# Patient Record
Sex: FEMALE | Race: WHITE | NOT HISPANIC OR LATINO | URBAN - METROPOLITAN AREA
[De-identification: names, ages, dates, MRNs, and addresses within clinical notes are randomized per-mention and may not be internally consistent; named-entity substitution may affect disease eponyms.]

---

## 2017-01-01 ENCOUNTER — HOME CARE/HOSPICE - HEALTHEAST (OUTPATIENT)
Dept: HOME HEALTH SERVICES | Facility: HOME HEALTH | Age: 67
End: 2017-01-01

## 2017-01-01 ENCOUNTER — COMMUNICATION - HEALTHEAST (OUTPATIENT)
Dept: HOME HEALTH SERVICES | Facility: HOME HEALTH | Age: 67
End: 2017-01-01

## 2017-01-01 ENCOUNTER — AMBULATORY - HEALTHEAST (OUTPATIENT)
Dept: VASCULAR SURGERY | Facility: CLINIC | Age: 67
End: 2017-01-01

## 2017-01-01 ENCOUNTER — OFFICE VISIT - HEALTHEAST (OUTPATIENT)
Dept: VASCULAR SURGERY | Facility: CLINIC | Age: 67
End: 2017-01-01

## 2017-01-01 ENCOUNTER — TRANSFERRED RECORDS (OUTPATIENT)
Dept: HEALTH INFORMATION MANAGEMENT | Facility: CLINIC | Age: 67
End: 2017-01-01

## 2017-01-01 ENCOUNTER — COMMUNICATION - HEALTHEAST (OUTPATIENT)
Dept: VASCULAR SURGERY | Facility: CLINIC | Age: 67
End: 2017-01-01

## 2017-01-01 ENCOUNTER — OFFICE VISIT (OUTPATIENT)
Dept: GASTROENTEROLOGY | Facility: CLINIC | Age: 67
End: 2017-01-01
Attending: INTERNAL MEDICINE
Payer: COMMERCIAL

## 2017-01-01 ENCOUNTER — HOSPITAL ENCOUNTER (OUTPATIENT)
Dept: ULTRASOUND IMAGING | Facility: HOSPITAL | Age: 67
Discharge: HOME | End: 2017-07-28
Attending: INTERNAL MEDICINE

## 2017-01-01 ENCOUNTER — AMBULATORY - HEALTHEAST (OUTPATIENT)
Dept: LAB | Facility: HOSPITAL | Age: 67
End: 2017-01-01

## 2017-01-01 ENCOUNTER — MEDICAL CORRESPONDENCE (OUTPATIENT)
Dept: TRANSPLANT | Facility: CLINIC | Age: 67
End: 2017-01-01

## 2017-01-01 ENCOUNTER — TELEPHONE (OUTPATIENT)
Dept: GASTROENTEROLOGY | Facility: CLINIC | Age: 67
End: 2017-01-01

## 2017-01-01 ENCOUNTER — RECORDS - HEALTHEAST (OUTPATIENT)
Dept: ADMINISTRATIVE | Facility: OTHER | Age: 67
End: 2017-01-01

## 2017-01-01 ENCOUNTER — COMMUNICATION - HEALTHEAST (OUTPATIENT)
Dept: OCCUPATIONAL THERAPY | Facility: CLINIC | Age: 67
End: 2017-01-01

## 2017-01-01 ENCOUNTER — OFFICE VISIT (OUTPATIENT)
Dept: TRANSPLANT | Facility: CLINIC | Age: 67
End: 2017-01-01
Attending: TRANSPLANT SURGERY
Payer: COMMERCIAL

## 2017-01-01 ENCOUNTER — TELEPHONE (OUTPATIENT)
Dept: TRANSPLANT | Facility: CLINIC | Age: 67
End: 2017-01-01

## 2017-01-01 ENCOUNTER — COMMITTEE REVIEW (OUTPATIENT)
Dept: TRANSPLANT | Facility: CLINIC | Age: 67
End: 2017-01-01

## 2017-01-01 ENCOUNTER — APPOINTMENT (OUTPATIENT)
Dept: TRANSPLANT | Facility: CLINIC | Age: 67
End: 2017-01-01
Attending: INTERNAL MEDICINE
Payer: COMMERCIAL

## 2017-01-01 ENCOUNTER — HOSPITAL ENCOUNTER (OUTPATIENT)
Dept: CARDIOLOGY | Facility: HOSPITAL | Age: 67
Discharge: HOME | End: 2017-08-01
Attending: INTERNAL MEDICINE

## 2017-01-01 VITALS
HEIGHT: 60 IN | WEIGHT: 224 LBS | TEMPERATURE: 98 F | SYSTOLIC BLOOD PRESSURE: 110 MMHG | OXYGEN SATURATION: 97 % | RESPIRATION RATE: 16 BRPM | HEART RATE: 77 BPM | DIASTOLIC BLOOD PRESSURE: 64 MMHG | BODY MASS INDEX: 43.98 KG/M2

## 2017-01-01 VITALS — WEIGHT: 170 LBS | BODY MASS INDEX: 32.1 KG/M2 | HEIGHT: 61 IN

## 2017-01-01 DIAGNOSIS — L97.922 ULCERS OF BOTH LOWER LEGS WITH FAT LAYER EXPOSED (H): ICD-10-CM

## 2017-01-01 DIAGNOSIS — E50.9 VITAMIN A DEFICIENCY: ICD-10-CM

## 2017-01-01 DIAGNOSIS — K74.3 PRIMARY BILIARY CIRRHOSIS (H): ICD-10-CM

## 2017-01-01 DIAGNOSIS — I87.303 VENOUS HYPERTENSION OF BOTH LOWER EXTREMITIES: ICD-10-CM

## 2017-01-01 DIAGNOSIS — L90.5 SCAR CONDITION AND FIBROSIS OF SKIN: ICD-10-CM

## 2017-01-01 DIAGNOSIS — K76.6 HYPERTENSION, PORTAL (H): ICD-10-CM

## 2017-01-01 DIAGNOSIS — L97.912 ULCERS OF BOTH LOWER LEGS WITH FAT LAYER EXPOSED (H): ICD-10-CM

## 2017-01-01 DIAGNOSIS — L85.9 HYPERKERATOSIS OF SKIN: ICD-10-CM

## 2017-01-01 DIAGNOSIS — L97.922 LEG ULCER, LEFT, WITH FAT LAYER EXPOSED (H): ICD-10-CM

## 2017-01-01 DIAGNOSIS — I89.0 ACQUIRED LYMPHEDEMA OF LEG: ICD-10-CM

## 2017-01-01 DIAGNOSIS — R60.9 DEPENDENT EDEMA: ICD-10-CM

## 2017-01-01 DIAGNOSIS — E66.01 MORBID OBESITY WITH BMI OF 40.0-44.9, ADULT (H): ICD-10-CM

## 2017-01-01 DIAGNOSIS — R18.8 ASCITES: ICD-10-CM

## 2017-01-01 DIAGNOSIS — R17 JAUNDICE: ICD-10-CM

## 2017-01-01 DIAGNOSIS — I89.0 LYMPHEDEMA: ICD-10-CM

## 2017-01-01 DIAGNOSIS — I89.0 LYMPHEDEMA OF BOTH LOWER EXTREMITIES: ICD-10-CM

## 2017-01-01 DIAGNOSIS — K74.3 PRIMARY BILIARY CIRRHOSIS (H): Primary | ICD-10-CM

## 2017-01-01 DIAGNOSIS — R60.0 BILATERAL LEG EDEMA: ICD-10-CM

## 2017-01-01 DIAGNOSIS — R18.8 OTHER ASCITES: ICD-10-CM

## 2017-01-01 DIAGNOSIS — K74.3 HEPATIC CIRRHOSIS DUE TO PRIMARY BILIARY CHOLANGITIS (H): Primary | ICD-10-CM

## 2017-01-01 DIAGNOSIS — K74.3 PBC (PRIMARY BILIARY CIRRHOSIS): ICD-10-CM

## 2017-01-01 DIAGNOSIS — E55.9 VITAMIN D DEFICIENCY: ICD-10-CM

## 2017-01-01 DIAGNOSIS — L03.90 CELLULITIS: ICD-10-CM

## 2017-01-01 DIAGNOSIS — K74.4 SECONDARY BILIARY CIRRHOSIS (H): Primary | ICD-10-CM

## 2017-01-01 DIAGNOSIS — E66.01 MORBID OBESITY DUE TO EXCESS CALORIES (H): ICD-10-CM

## 2017-01-01 DIAGNOSIS — K70.31 ASCITES DUE TO ALCOHOLIC CIRRHOSIS (H): ICD-10-CM

## 2017-01-01 DIAGNOSIS — K74.3 HEPATIC CIRRHOSIS DUE TO PRIMARY BILIARY CHOLANGITIS (H): ICD-10-CM

## 2017-01-01 DIAGNOSIS — K74.3 CIRRHOSIS, PRIMARY BILIARY: ICD-10-CM

## 2017-01-01 LAB
AORTIC ROOT: 3.1 CM
AORTIC VALVE MEAN VELOCITY: 121 CM/S
AV DIMENSIONLESS INDEX VTI: 0.8
AV MEAN GRADIENT: 7 MMHG
AV PEAK GRADIENT: 13.7 MMHG
AV VALVE AREA: 2.4 CM2
AV VELOCITY RATIO: 0.6
BSA FOR ECHO PROCEDURE: 1.8 M2
CV BLOOD PRESSURE: NORMAL MMHG
CV ECHO HEIGHT: 61 IN
CV ECHO WEIGHT: 167 LBS
DOP CALC AO PEAK VEL: 185 CM/S
DOP CALC AO VTI: 41.6 CM
DOP CALC LVOT AREA: 3.14 CM2
DOP CALC LVOT DIAMETER: 2 CM
DOP CALC LVOT PEAK VEL: 115 CM/S
DOP CALC LVOT STROKE VOLUME: 99.9 CM3
DOP CALC MV VTI: 33.9 CM
DOP CALCLVOT PEAK VEL VTI: 31.8 CM
EJECTION FRACTION: 64 % (ref 55–75)
FRACTIONAL SHORTENING: 41.4 % (ref 28–44)
INTERVENTRICULAR SEPTUM IN END DIASTOLE: 0.83 CM (ref 0.6–0.9)
IVS/PW RATIO: 0.8
LA AREA 1: 21.3 CM2
LA AREA 2: 21.2 CM2
LEFT ATRIUM LENGTH: 5.4 CM
LEFT ATRIUM SIZE: 4.3 CM
LEFT ATRIUM TO AORTIC ROOT RATIO: 1.39 NO UNITS
LEFT ATRIUM VOLUME INDEX: 39.5 ML/M2
LEFT ATRIUM VOLUME: 71.1 CM3
LEFT VENTRICLE CARDIAC INDEX: 3.8 L/MIN/M2
LEFT VENTRICLE CARDIAC OUTPUT: 6.8 L/MIN
LEFT VENTRICLE DIASTOLIC VOLUME INDEX: 30.9 CM3/M2 (ref 34–74)
LEFT VENTRICLE DIASTOLIC VOLUME: 55.7 CM3 (ref 46–106)
LEFT VENTRICLE HEART RATE: 68 BPM
LEFT VENTRICLE MASS INDEX: 89.6 G/M2
LEFT VENTRICLE SYSTOLIC VOLUME INDEX: 11.2 CM3/M2 (ref 11–31)
LEFT VENTRICLE SYSTOLIC VOLUME: 20.1 CM3 (ref 14–42)
LEFT VENTRICULAR INTERNAL DIMENSION IN DIASTOLE: 4.78 CM (ref 3.8–5.2)
LEFT VENTRICULAR INTERNAL DIMENSION IN SYSTOLE: 2.8 CM (ref 2.2–3.5)
LEFT VENTRICULAR MASS: 161.2 G
LEFT VENTRICULAR OUTFLOW TRACT MEAN GRADIENT: 3 MMHG
LEFT VENTRICULAR OUTFLOW TRACT MEAN VELOCITY: 78.9 CM/S
LEFT VENTRICULAR OUTFLOW TRACT PEAK GRADIENT: 5 MMHG
LEFT VENTRICULAR POSTERIOR WALL IN END DIASTOLE: 1.1 CM (ref 0.6–0.9)
LV STROKE VOLUME INDEX: 55.5 ML/M2
MITRAL VALVE DECELERATION SLOPE: 3130 MM/S2
MITRAL VALVE E/A RATIO: 0.8
MITRAL VALVE MEAN INFLOW VELOCITY: 61.4 CM/S
MITRAL VALVE PEAK VELOCITY: 99 CM/S
MITRAL VALVE PRESSURE HALF-TIME: 90 MS
MV AREA VTI: 2.95 CM2
MV AVERAGE E/E' RATIO: 9.9 CM/S
MV DECELERATION TIME: 282 MS
MV E'TISSUE VEL-LAT: 9.38 CM/S
MV E'TISSUE VEL-MED: 8.22 CM/S
MV LATERAL E/E' RATIO: 9.3
MV MEAN GRADIENT: 2 MMHG
MV MEDIAL E/E' RATIO: 10.6
MV PEAK A VELOCITY: 110 CM/S
MV PEAK E VELOCITY: 87.3 CM/S
MV PEAK GRADIENT: 3.9 MMHG
MV VALVE AREA BY CONTINUITY EQUATION: 2.9 CM2
MV VALVE AREA PRESSURE 1/2 METHOD: 2.4 CM2
NUC REST DIASTOLIC VOLUME INDEX: 2672 LBS
NUC REST SYSTOLIC VOLUME INDEX: 61 IN
RIGHT VENTRICULAR INTERNAL DIMENSION IN DYSTOLE: 3.13 CM
TRICUSPID REGURGITATION PEAK PRESSURE GRADIENT: 28.1 MMHG
TRICUSPID VALVE ANULAR PLANE SYSTOLIC EXCURSION: 3.1 CM
TRICUSPID VALVE PEAK REGURGITANT VELOCITY: 265 CM/S

## 2017-01-01 RX ORDER — LACTULOSE 10 G/15ML
30 SOLUTION ORAL 2 TIMES DAILY
COMMUNITY

## 2017-01-01 ASSESSMENT — MIFFLIN-ST. JEOR
SCORE: 1451.69
SCORE: 1224.89
SCORE: 1542.4

## 2017-01-01 ASSESSMENT — PAIN SCALES - GENERAL: PAINLEVEL: NO PAIN (0)

## 2017-06-26 PROBLEM — K74.3 PRIMARY BILIARY CIRRHOSIS (H): Status: ACTIVE | Noted: 2017-01-01

## 2017-06-26 PROBLEM — K76.82 HEPATIC ENCEPHALOPATHY (H): Status: ACTIVE | Noted: 2017-01-01

## 2017-06-26 NOTE — TELEPHONE ENCOUNTER
PBC    MELD 13-19  Issues with encephalopathy recently    LM on home phone to discuss recent health events, and scheduling for eval vs consult    Plan to schedule with Dr. Lee 7/11

## 2017-06-26 NOTE — LETTER
2017    [facility]  [address] [place] [address] [address]  [facility] [address]  Phone: [phone]  Fax: [phone]     Re: Maisha Syed  : 1950     Dear Dr. Gamaliel Calvert,    Thank you for referring your patient, Maisha Syed. We are writing to inform you that Ms. Syed has completed the initial referral process with us to be considered for a liver transplant at the [facility].    Ms. Syed assigned transplant coordinator is Carla Schwartz.  If you should have any questions or concerns, please feel free to call us at [phone] or [phone].  Our regular office hours are Monday - Friday from 8:30am to 5:00pm.    Sincerely,        Liver Transplant Team  [facility]

## 2017-06-26 NOTE — TELEPHONE ENCOUNTER
Received referral from JOSE Calvert  Diagnosis- Primary Biliary Cirrhosis  Coordinator- Carla [id number] [facility]- [id number]   on AM for intake questions

## 2017-06-27 NOTE — TELEPHONE ENCOUNTER
Intake Progress Note    Organ: Liver        On Dialysis:   No    Dialysis Schedule:  Days/shift  or N/A    Reported Medical History:Spoke with - Devon- she tires easily- Cirrhosis, Paracentesis x 2  Inpt- [facility] x 3 since Jan    Records:  I will request.     Clinic RN Appt (Only Adult Kidney, Liver and Pancreas Referrals): Y     Preferred Evaluation Start Date:  ASAP     Online Forms    Best time patient can be reached: anytime    Type of packet sent:  Liver     Misc. Notes: May speak with emergency contacts listed in OTTR.   Devon    Verbal Consent: Y    Email Consent: Y or N    If no PCP or referring information the time of call informed pt to call back with information: Y or N    Informed patient to call if doesn't receive packet and or phone call from coordinator within given time Y

## 2017-06-29 PROBLEM — R18.8 ASCITES: Status: ACTIVE | Noted: 2017-01-01

## 2017-07-12 NOTE — TELEPHONE ENCOUNTER
Catalina was able to reach patient and/or family member. They state that they do not wish to proceed with transplant at this time, which is why they are not returning phone calls.     Letter sent to patient and referring. Episode will be closed, but they can call with questions/changes at any time.

## 2017-07-31 NOTE — Clinical Note
Please schedule for consult with Jesus on his next eval day, 8/22 I think... That is all she will do. No labs... Please call her with appt. Thanks, D

## 2017-07-31 NOTE — LETTER
2017    Gamaliel Calvert  [facility]   [facility] [address]  [facility] [address]  Phone: [phone]  Fax: [phone]     Arnoldo Moore  [facility]   [facility] [address]  [facility] [address]  Phone: [phone]  Fax: [phone]     Re: Maisha Syed  : 1950     Dear Dr. Gamaliel Calvert, Arnoldo Moore,    Thank you for referring your patient, Maisha Syed. We are writing to inform you that Ms. Syed has completed the initial referral process with us to be considered for a liver transplant at the [facility].    Ms. Syed assigned transplant coordinator is Carla Ngo.  If you should have any questions or concerns, please feel free to call us at [phone] or [phone].  Our regular office hours are Monday - Friday from 8:30am to 5:00pm.    Sincerely,        Liver Transplant Team  [facility]

## 2017-07-31 NOTE — TELEPHONE ENCOUNTER
Pt  Devon called, stated they had a appointment with [facility] and recommended and encouraged pt to start evaluation at [facility]- per - pt is OK to go forward.  They still have the packet.   Sent  to Leroy BARRY to contact patient or   [facility] sent last clinic visit with labs.

## 2017-07-31 NOTE — LETTER
LIVER CONSULT   SCHEDULE    Patient:   Maisha Syed  MR#:    [id number]    Coordinator:  Carla Tineo                                        [phone]  :     Shena SUNSHINE     [phone]  [facility]    [facility]  Date(s):    August 22, 2017    This is your consult schedule, please follow dates and times.  You will   receive reminder phone calls for other tests, but please follow this schedule  only!  If you have any questions about dates and times, please call us on  number listed above.  Thank you, Transplant Services       Day/Date:    Tuesday, August 22, 2017  Time Location Activity   9:30 a.m. Transplant Services  (3rd floor [facility]) Review evaluation process & daily schedule   10:00 a.m. Medicine Specialties  (3rd floor [facility]) Appointment with Dr. Lee,  Hepatologist       Day/Date:    Every Thursday *OPTIONAL*  Time Location Activity   12:00p.m. - 1:30p.m. Liver Transplant Support Group  Hospital Station [facility]  Room 7-120 Every Thursday *OPTIONAL*     *IF ON LINE CHECK IN IS NOT DONE, YOU WILL NEED TO CHECK IN 15 MINUTES EARLIER THEN THE FIRST SCHEDULED APPOINTMENT*

## 2017-08-02 NOTE — TELEPHONE ENCOUNTER
This patient agreed to come for Liver Consult Appointments on 8/22/17. All appointments scheduled and a copy of the Liver Consult Schedule has been sent to the patient via [employer].

## 2017-08-02 NOTE — TELEPHONE ENCOUNTER
"Referring: Arnoldo Moore    PBC, dx about 10 years ago. Mother has it as well, was found by primary when tested for it. Started having issues about 2 years ago, worst within the last 6 months    Legs were weeping, seeing lymph edema clinic   Ascites, having paracentesis, on diuretics. Last paracentesis was Feb or March. Attempted para yesterday, but unable to pull fluid off.     Med hx  Heart murmur  Kidney issues now, seeing nephrology at primary clinic next week    Surgery hx  Cholecytectomy about 15 years ago  Liver biopsy done about 4 years ago    , 3 adult children, I passed away in motorcycle accident       Patient was referred for txp evaluation by another MD over a month ago. She refused, stated she did not want evaluation. Episode was closed. She then was seen recently by Dr. Moore at [facility], who strongly encouraged her to come in for evaluation, she agreed. I started discussing the evaluation process with her, she stopped me, and told me if we planned on doing any testing or lab work to forget it, \"I would rather die than do any of that.\" She will not agree to any lab work or testing. Offerred her a solo appt with a Hepatologist as a consult to discuss plan moving forward, she agreed with that.     Plan for consult with no labs or US with Dr. Lee on 8/22      "

## 2017-08-10 NOTE — TELEPHONE ENCOUNTER
Patient contacted and reminded of upcoming appointment.  Patient confirmed they will be attending.  Patient instructed to bring updated medications list to appointment.  Instructed pt to arrive an hour and a half to two hours prior to appt time for labs.  Luis Meade, CMA

## 2017-08-22 NOTE — LETTER
8/22/2017       RE: Maisha Syed  [address] [facility] [address]     Dear Colleague,    Thank you for referring your patient, Maisha Syed, to the [facility] SOLID ORGAN TRANSPLANT at [facility]. Please see a copy of my visit note below.    Patient attended all appointments and completed all scheduled tests.  Patient to follow up with Transplant Coordinator.  Patient stated understanding and has contact numbers.      Again, thank you for allowing me to participate in the care of your patient.      Sincerely,    Transplant Evaluation Resource

## 2017-08-22 NOTE — MR AVS SNAPSHOT
"              After Visit Summary   2017    Maisha Syed    MRN: [id number]           Patient Information     Date Of Birth          1950        Visit Information        Provider Department      2017 10:30 AM Yann Mir MD [facility] Solid Organ Transplant        Today's Diagnoses     Secondary biliary cirrhosis (H)    -  1       Follow-ups after your visit        Who to contact     If you have questions or need follow up information about today's clinic visit or your schedule please contact [facility] SOLID ORGAN TRANSPLANT directly at [phone].  Normal or non-critical lab and imaging results will be communicated to you by InishTechhart, letter or phone within 4 business days after the clinic has received the results. If you do not hear from us within 7 days, please contact the clinic through Aionext or phone. If you have a critical or abnormal lab result, we will notify you by phone as soon as possible.  Submit refill requests through Belmont or call your pharmacy and they will forward the refill request to us. Please allow 3 business days for your refill to be completed.          Additional Information About Your Visit        MyChart Information     Belmont lets you send messages to your doctor, view your test results, renew your prescriptions, schedule appointments and more. To sign up, go to [email or web address] . Click on \"Log in\" on the left side of the screen, which will take you to the Welcome page. Then click on \"Sign up Now\" on the right side of the page.     You will be asked to enter the access code listed below, as well as some personal information. Please follow the directions to create your username and password.     Your access code is: [email or web address]  Expires: 2017  6:31 AM     Your access code will  in 90 days. If you need help or a new code, please call your [place] clinic or [phone].        Care EveryWhere ID     This is your Care EveryWhere ID. This could " be used by other organizations to access your [place] medical records  [id number]         Blood Pressure from Last 3 Encounters:   08/22/17 110/64    Weight from Last 3 Encounters:   08/22/17 101.6 kg (224 lb)   06/27/17 77.1 kg (170 lb)              Today, you had the following     No orders found for display         Today's Medication Changes          These changes are accurate as of: 8/22/17 11:59 PM.  If you have any questions, ask your nurse or doctor.               Stop taking these medicines if you haven't already. Please contact your care team if you have questions.     OMEPRAZOLE PO   Stopped by:  Devang Lee MD           RIFAXIMIN PO   Stopped by:  Devang Lee MD                    Primary Care Provider Office Phone # Fax #    Narinder Lopez [phone]       [facility] [address]  [address]  [facility] [address]        Equal Access to Services     [facility]: Hadii elizabeth dodgeo Soluis alberto, waaxda luqzeynep, qaybta kaalmada aderaeyanikhil, ethan alfaro . So [facility] [phone].    ATENCIÓN: Si habla español, tiene a garcia disposición servicios gratuitos de asistencia lingüística. Zoila al [phone].    We comply with applicable federal civil rights laws and [place] laws. We do not discriminate on the basis of race, color, national origin, age, disability sex, sexual orientation or gender identity.            Thank you!     Thank you for choosing [facility] SOLID ORGAN TRANSPLANT  for your care. Our goal is always to provide you with excellent care. Hearing back from our patients is one way we can continue to improve our services. Please take a few minutes to complete the written survey that you may receive in the mail after your visit with us. Thank you!             Your Updated Medication List - Protect others around you: Learn how to safely use, store and throw away your medicines at www.disposemymeds.org.          This list is accurate as of:  8/22/17 11:59 PM.  Always use your most recent med list.                   Brand Name Dispense Instructions for use Diagnosis    BUMEX PO      Take 1 mg by mouth daily        lactulose 10 GM/15ML solution    CHRONULAC     Take 30 g by mouth 2 times daily        LEVOTHYROXINE SODIUM PO      Take 112 mcg by mouth daily        MAGNESIUM OXIDE PO      Take 500 mg by mouth 2 times daily        ONDANSETRON PO      Take 4 mg by mouth every 6 hours as needed for nausea        SPIRONOLACTONE PO      Take 50 mg by mouth daily        URSODIOL PO      Take 300 mg by mouth 2 times daily 2 tabs BID        vitamin A 03748 UNIT capsule      Take 10,000 Units by mouth daily

## 2017-08-22 NOTE — Clinical Note
Patient saw Jesus in consult, agreed to full eval. Please schedule for next time Lee is on, she should see him again in eval as well. Thanks, D

## 2017-08-22 NOTE — LETTER
"8/22/2017       RE: Maisha Syed  [address] [address]     Dear Colleague,    Thank you for referring your patient, Maisha Syed, to the [facility] SOLID ORGAN TRANSPLANT at [facility]. Please see a copy of my visit note below.    Assessment and Plan:  1. liver transplant evaluation - patient is a good candidate overall. Benefits and surgical risks of a liver transplantation were discussed.  2.  End stage liver disease due to primary biliary cirrohsis    Surgical evaluation:  1. Portal Vein:needs US  2. Hepatic Artery: needs US  3. TIPS: absent  4. Previous Abdominal Surgery: Yes - cholecystecomy  5. Hepatocellular Carcinoma: None  6. Ascites: Present - large amount  7. Costal Angle: wide  8. Portopulmonary Hypertension: absent  9. Hepatopulmonary Syndrome: absent  10. Cardiac Evaluation: adequate   11. Nutritional Status: Poor  12. Diabetes: none   13.Hypertension none  14. Smoker:no        Recommendations: 1.  Needs to complete full liver tx eval including lab work-up; 2. Imaging of portal system - US;       Patients overall evaluation will be discussed at the Liver Transplant selection committee meeting with a final recommendation on the patients suitability for transplant to be made at that time.    Consult Full  Details:  Maisha Syed was seen in consultation at the request of Dr. Moore for evaluation as a potential liver transplant recipient.    Reason for Visit:  Maisha Syed is a 66 year old year old lady with primary biliary cirrohsis, who presents for liver transplant evaluation.    Her dz is complicated by jaundice, ascites, sever lymphedema, HE and EVs.  She recently again was hospitalized with HE likely secondary to UTI.    ABO \"O\"        Past Medical History:   Diagnosis Date     Ascites 6/29/2017     Hepatic encephalopathy (H) 6/26/2017     Primary biliary cirrhosis (H) 6/26/2017     Past Surgical History:   Procedure Laterality Date     " CHOLECYSTECTOMY       Past Surgical History:   Procedure Laterality Date     CHOLECYSTECTOMY       No family history on file.  No Known Allergies  Prior to Admission medications    Medication Sig Start Date End Date Taking? Authorizing Provider   Bumetanide (BUMEX PO) Take 1 mg by mouth daily    Reported, Patient   lactulose (CHRONULAC) 10 GM/15ML solution Take 30 g by mouth 2 times daily    Reported, Patient   LEVOTHYROXINE SODIUM PO Take 112 mcg by mouth daily    Reported, Patient   MAGNESIUM OXIDE PO Take 500 mg by mouth 2 times daily    Reported, Patient   ONDANSETRON PO Take 4 mg by mouth every 6 hours as needed for nausea    Reported, Patient   SPIRONOLACTONE PO Take 50 mg by mouth daily    Reported, Patient   URSODIOL PO Take 300 mg by mouth 2 times daily 2 tabs BID    Reported, Patient   vitamin A 34158 UNIT capsule Take 10,000 Units by mouth daily    Reported, Patient       Previous Transplant Hx: No    Cardiovascular Hx:       h/o Cardiac Issues: No       Exercise Tolerance: no chest pain  Potential Donor(s): unknown  ROS:    REVIEW OF SYSTEMS (check box if normal)  [x]                GENERAL  [x]                  PULMONARY [x]                 GENITOURINARY  [x]                 CNS                 [x]                  CARDIAC  [x]                  ENDOCRINE  [x]                 EARS,NOSE,THROAT [x]                  GASTROINTESTINAL [x]                  NEUROLOGIC    [x]                 MUSCLOSKELTAL  [x]                   HEMATOLOGY    Examination:     Vitals:  There were no vitals taken for this visit.    GENERAL APPEARANCE: alert and no distress  EYES: PERRL  HENT: mouth without ulcers or lesions  NECK: supple, no adenopathy  RESP: lungs clear to auscultation - no rales, rhonchi or wheezes  CV: regular rhythm, normal rate, no rub   ABDOMEN:  soft, nontender, no HSM or masses and bowel sounds normal  MS: extensive LEs edema  SKIN: no rash  NEURO: Normal strength and tone, sensory exam grossly normal,  mentation intact and speech normal  PSYCH: mentation appears normal. and affect normal/bright      Results: No results found for this or any previous visit (from the past 168 hour(s)).  I had a long discussion with the patient regarding liver transplantation which included but was not limited to  the following points:    1. Liver transplant selection committee process.  2. The federal rules for cadaveric waiting list, the size and blood type matching of the organ. The availability of living-related donor transplantation.  3. The types of donors: brain death donors, non-heart beating donors, partial liver grafts: splits and living donor grafts  4. Extended criteria  Donors (older age, steasosis) and the increased  risk of primary non-function using the extended criteria donors  5. The CDC high risk donors,  Risk of donor transmitted infections and donor transmitted malignancy  6. The liver transplant operation and the associated risks and technical complications which can include intraoperative death, post operative death,  Primary non-function, bleeding requiring re-operations, arterial and biliary complications, bowel perforations, and intra abdominal abscess. Some of these complicaitons may require a second operation.  7. The postoperative course, the ICU stay and risk of postoperative complications which can include sepsis, MI, stroke, brain injury, pneumonia, pleural effusions, and renal dysfunction.  8. The current 1 year and 5 year graft and patient survivals.  9. The need for life long immunosuppressive therapy and the side effects of these medications, including the possibility of toxicity, opportunistic infections, risk of cancer including lymphoma, and the possibility of rejection even if the patient is taking the medication exactly as prescribed.  10. The need for compliance with medications and follow-up visits in the clinic and thereafter.  11. The patient and family understand these risks and wish to  proceed to transplantation       I spent 60 minutes with the patient and more than 50% of the time was spend in direct face to face counseling.      Again, thank you for allowing me to participate in the care of your patient.      Sincerely,    Yann Mir MD

## 2017-08-22 NOTE — MR AVS SNAPSHOT
"              After Visit Summary   2017    Maisha Syed    MRN: [id number]           Patient Information     Date Of Birth          1950        Visit Information        Provider Department      2017 9:30 AM [facility] EVALUATION [facility] Solid Organ Transplant        Today's Diagnoses     Primary biliary cirrhosis (H)    -  1       Follow-ups after your visit        Who to contact     If you have questions or need follow up information about today's clinic visit or your schedule please contact [facility] SOLID ORGAN TRANSPLANT directly at [phone].  Normal or non-critical lab and imaging results will be communicated to you by Young Innovationshart, letter or phone within 4 business days after the clinic has received the results. If you do not hear from us within 7 days, please contact the clinic through SYLLETAt or phone. If you have a critical or abnormal lab result, we will notify you by phone as soon as possible.  Submit refill requests through GROUNDFLOOR or call your pharmacy and they will forward the refill request to us. Please allow 3 business days for your refill to be completed.          Additional Information About Your Visit        MyChart Information     GROUNDFLOOR lets you send messages to your doctor, view your test results, renew your prescriptions, schedule appointments and more. To sign up, go to [email or web address] . Click on \"Log in\" on the left side of the screen, which will take you to the Welcome page. Then click on \"Sign up Now\" on the right side of the page.     You will be asked to enter the access code listed below, as well as some personal information. Please follow the directions to create your username and password.     Your access code is: [email or web address]  Expires: 2017  6:31 AM     Your access code will  in 90 days. If you need help or a new code, please call your [place] clinic or [phone].        Care EveryWhere ID     This is your Care EveryWhere ID. This could be used " by other organizations to access your [place] medical records  [id number]        Your Vitals Were     Pulse Temperature Respirations Height Pulse Oximetry BMI (Body Mass Index)    77 98  F (36.7  C) (Oral) 16 1.524 m (5') 97% 43.75 kg/m2       Blood Pressure from Last 3 Encounters:   08/22/17 110/64    Weight from Last 3 Encounters:   08/22/17 101.6 kg (224 lb)   06/27/17 77.1 kg (170 lb)              Today, you had the following     No orders found for display         Today's Medication Changes          These changes are accurate as of: 8/22/17 11:59 PM.  If you have any questions, ask your nurse or doctor.               Stop taking these medicines if you haven't already. Please contact your care team if you have questions.     OMEPRAZOLE PO   Stopped by:  Devang Lee MD           RIFAXIMIN PO   Stopped by:  Devang Lee MD                    Primary Care Provider Office Phone # Fax #    Narinder Lopez [phone]       [facility] [address]   [facility] [address]        Equal Access to Services     [facility]: Hadii elizabeth shipley hadyaritza Soluis alberto, waaxda luqzeynep, qaybta kaalmanikhil edwitt, ethan alfaro . So [facility] [phone].    ATENCIÓN: Si habla español, tiene a garcia disposición servicios gratuitos de asistencia lingüística. Zoila al [phone].    We comply with applicable federal civil rights laws and [place] laws. We do not discriminate on the basis of race, color, national origin, age, disability sex, sexual orientation or gender identity.            Thank you!     Thank you for choosing [facility] SOLID ORGAN TRANSPLANT  for your care. Our goal is always to provide you with excellent care. Hearing back from our patients is one way we can continue to improve our services. Please take a few minutes to complete the written survey that you may receive in the mail after your visit with us. Thank you!             Your Updated Medication List  - Protect others around you: Learn how to safely use, store and throw away your medicines at www.disposemymeds.org.          This list is accurate as of: 8/22/17 11:59 PM.  Always use your most recent med list.                   Brand Name Dispense Instructions for use Diagnosis    BUMEX PO      Take 1 mg by mouth daily        lactulose 10 GM/15ML solution    CHRONULAC     Take 30 g by mouth 2 times daily        LEVOTHYROXINE SODIUM PO      Take 112 mcg by mouth daily        MAGNESIUM OXIDE PO      Take 500 mg by mouth 2 times daily        ONDANSETRON PO      Take 4 mg by mouth every 6 hours as needed for nausea        SPIRONOLACTONE PO      Take 50 mg by mouth daily        URSODIOL PO      Take 300 mg by mouth 2 times daily 2 tabs BID        vitamin A 72639 UNIT capsule      Take 10,000 Units by mouth daily

## 2017-08-22 NOTE — COMMITTEE REVIEW
Abdominal Patient Discussion Note Transplant Coordinator: Carla Tineo  Transplant Surgeon:       Referring Physician: Arnoldo Moore    Committee Review Members:  Nutrition Rachael Coello, RD   Pharmacy Tavares Delcid, [facility]    - Clinical TUTU Rodriguez, Cindy Hollis, [facility]   Transplant Francesca Lemus MD, Marianela Garcia, RN, Carla Tineo RN, Devang Lee MD, Jr Benson Johnston, NGOZI, Shena Andersen MD, Karie Bryan, APRN CNP, Dennis Amezquita MD, Phuc Leyva MD, Yann Mir MD       Additional Discussion Notes and Findings:     Start full eval, patient agrees per Dr Lee   No nephrology at this time, will wait for labs     Carla Tineo RN, BSN  Liver transplant coordinator  [phone] Office

## 2017-08-22 NOTE — LETTER
8/22/2017       RE: Maisha Syed  [address] [address]     Dear Colleague,    Thank you for referring your patient, Maisha Syed, to the [facility] HEPATOLOGY at [facility]. Please see a copy of my visit note below.    [facility]    Hepatology New Patient Visit    Referring provider:  Arnoldo Moore      66 year old female    Chief complaint: candidacy for liver transplantation evaluation.     HPI:  I had the pleasure of seeing Ms. Maisha Syed in the Liver Transplantation Clinic at the [facility] on 08/22/2017.  We are seeing Ms. 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  Ms. Maisha Syed was originally diagnosed with PBC approximately 10 years ago when she had a screening physical exam that revealed a hepatic panel with an elevated alkaline phosphatase.  She had a long period of well-controlled disease on ursodiol for several years, but, unfortunately, in the past few months her disease has decompensated significantly.  The first sign of decompensation was the development of ascites and really marked lower extremity edema, increasing fatigue, and difficulty in ambulation.  She additionally, over that same period of time, has had several hospitalizations for hepatic encephalopathy.  At least one of the hospitalizations was provoked by a UTI.  The other 2 hospitalizations sound like they were early on in the course of her diagnosis before she had adequate pharmacologic therapy.  She has had some ascites as well that did require a tap, but, again, the majority of her fluid accumulation has been in her lower extremities.  More recently, she has also had the development of large esophageal varices, which did require banding, and a significant decompensation in her labs predominantly in the form of a bilirubin elevation.  She apparently  has had a structural examination for causes of obstruction, and that has returned negative.  Given the significant decompensation of liver function tests, in addition to the development of significant amounts of lower extremity edema and hepatic encephalopathy, she is referred today for consideration for liver transplantation.  She does state that she has had a marked decrease in her amount of energy and ability to ambulate more recently, but otherwise feels relatively well considering the severity of her disease.  She specifically denies any significant confusion, unless she does miss a dose of her medication, in which case she does become rather lethargic.  Her  is around the house to ensure that the medications are administered on a regular basis.  She also has noticed that her eyes are increasingly yellow over the past several weeks as well.  She has developed some mild itching; although, she does not endorse any significant impingement on her daily ability to function.  As I mentioned previously, she has been hospitalized multiple times at [facility] for hepatic encephalopathy in the past few months.  For a long period of time she has been under the care of a general provider at [facility], but most recently has seen Dr. Arnoldo Moore, who then subsequently recommended that she be seen in the Transplant Center given her decompensation.  She does live at home with her , and she has a good social support system.  She does not consume alcohol.  She is a nonsmoker, and has never used illicit drugs.  She is relatively compliant with her medications, and does take her lactulose regularly; although, at times she does require some reminding from her  as well.  She also takes a stable dose of Bumex and spironolactone at 1 and 50, respectively, for the management of lower extremity edema and ascites.  It is worth noting that this has been increasingly difficult, and the dose has  been down-titrated due to the development of renal insufficiency as well.  Her other medical problems are significant for some hypothyroid for which she takes a stable dose of levothyroxine, and some mild nausea for which she takes occasional ondansetron.  She is also notably on ursodiol 600 mg b.i.d. for her PBC long-term.        No history of melena, hematemesis or hematochezia.    Patient denies fevers, sweats, chills or weight loss.    Medical hx Surgical hx   Past Medical History:   Diagnosis Date     Ascites 6/29/2017     Hepatic encephalopathy (H) 6/26/2017     Primary biliary cirrhosis (H) 6/26/2017      Past Surgical History:   Procedure Laterality Date     CHOLECYSTECTOMY            Medications  Prior to Admission medications    Medication Sig Start Date End Date Taking? Authorizing Provider   Bumetanide (BUMEX PO) Take 1 mg by mouth daily    Reported, Patient   lactulose (CHRONULAC) 10 GM/15ML solution Take 30 g by mouth 2 times daily    Reported, Patient   LEVOTHYROXINE SODIUM PO Take 112 mcg by mouth daily    Reported, Patient   MAGNESIUM OXIDE PO Take 500 mg by mouth 2 times daily    Reported, Patient   ONDANSETRON PO Take 4 mg by mouth every 6 hours as needed for nausea    Reported, Patient   SPIRONOLACTONE PO Take 50 mg by mouth daily    Reported, Patient   URSODIOL PO Take 300 mg by mouth 2 times daily 2 tabs BID    Reported, Patient   vitamin A 67744 UNIT capsule Take 10,000 Units by mouth daily    Reported, Patient       Allergies  No Known Allergies       Family hx Social hx   No family history of inflammatory bowel disease or GI malignancies   Social History   Substance Use Topics     Smoking status: Never Smoker     Smokeless tobacco: Never Used     Alcohol use No          Review of systems  A 10-point review of systems was negative.    Examination  There were no vitals taken for this visit.  There is no height or weight on file to calculate BMI.    Gen- well, NAD, A+Ox3, normal color  Eye-  EOMI  ENT- MMM, normal oropharynx  Lym- no palpable lymphadenopathy  CVS- S1, S2 normal, no added sounds, RRR  RS- CTA  Abd- distended, BS+, liver edge palpable 2 cm below costal margin  Extr- 4+++ LE edema, pitting to pelvis  MS- hands normal- no clubbing  Neuro- A+Ox3, no asterixis  Skin- no rash or jaundice  Psych- normal mood    Laboratory  No results found for: NA, POTASSIUM, CHLORIDE, CO2, BUN, CR    No results found for: BILITOTAL, ALT, AST, ALKPHOS    No results found for: ALBUMIN, PROTTOTAL     No results found for: WBC, HGB, MCV, PLT    No results found for: INR      Radiology  Her most recent ultrasound does reveal some stable mild intra and extrahepatic biliary dilatation which was thought to be likely reservoir effect from prior cholecystectomy.  It was also noted that she does have kit cirrhosis although there is no focal intrahepatic mass seen.  There was also a small amount of ascites seen, although that was present in all 4 quadrants.  She also had a recent echo 08/01 of this year for evaluation of this increasing lower extremity edema, which revealed a normal LV in size and function with EF 65%.  It also revealed that the RV was normal in size and systolic function and they also noticed that the pulmonary pressures were estimated to be normal as well.  There was some mild mitral valve regurgitation and some mild tricuspid valve regurgitation, but again, there is no pulmonary hypertension present.       ASSESSMENT AND PLAN:  My overall assessment is that Ms. Maisha Syed is a 66-year-old female with advanced and decompensated PBC cirrhosis.  Her decompensation is predominantly manifested with the development of esophageal varices, hepatic encephalopathy, significant ascites and lower extremity edema.  She has not yet had SBP, and her portal vein is patent.  However, her MELD when we calculated, based on the most recent labs available from [facility], was at 25, and this is a  significant increase from her pervious MELD, which was always in the 13-19 range.  Given his rate of decompensation, which does not have a clear foci for approximate cause, she certainly is worth considering for listing for transplantation in the setting of multiple things; decompensation and high MELD.  It does appear that she has a good social support system with her  primarily and several family and friends in the area.  She also appears able to be compliant with her medications, and is aware of the implications and certainly the potential complications of transplantation as well.  Given the combination of her high MELD, multiple signs of decompensation and a rather rapid rate of progression of her disease, we are recommending that she undergo full and expeditious evaluation for transplant candidacy, including seeing our surgeon, social workers, care coordinators, cardiologist, pulmonary doctors and otherwise.  We will plan on expediting this evaluation with the assistance of our transplant coordinator, Devon Johnston, and start this as soon as possible.  We will re-discuss her after the evaluation is complete.  I do anticipate that she would be a reasonable candidate for transplantation pending any surprises in the evaluation process.   Dictated by Desmond Patton MD, Fellow       Patient seen and discussed with Dr. Lee    The patient was seen and examined.  The above assessment and plan was developed jointly with the fellow.     More than 50% of the 60' visit was spent in face-to-face discussion regarding the benefits of liver transplantation, the process for listing, and the long-term care and outcomes of liver transplanation      Devang Lee MD      Professor of Medicine  [employer]      Executive Medical Director, Solid Organ Transplant Program  [facility]

## 2017-08-22 NOTE — MR AVS SNAPSHOT
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  Normal or non-critical lab and imaging results will be communicated to you by Naytevhart, letter or phone within 4 business days after the clinic has received the results. If you do not hear from us within 7 days, please contact the clinic through NexMedt or phone. If you have a critical or abnormal lab result, we will notify you by phone as soon as possible.  Submit refill requests through SHADO or call your pharmacy and they will forward the refill request to us. Please allow 3 business days for your refill to be completed.          Additional Information About Your Visit        MyChart Information     SHADO lets you send messages to your doctor, view your test results, renew your prescriptions, schedule appointments and more. To sign up, go to [email or web address] . Click on \"Log in\" on the left side of the screen, which will take you to the Welcome page. Then click on \"Sign up Now\" on the right side of the page.     You will be asked to enter the access code listed below, as well as some personal information. Please follow the directions to create your username and password.     Your access code is: [email or web address]  Expires: 2017  6:31 AM     Your access code will  in 90 days. If you need help or a new code, please call your [place] clinic or [phone].        Care EveryWhere ID     This is your Care EveryWhere ID. This could be " used by other organizations to access your [facility] medical records  [id number]         Blood Pressure from Last 3 Encounters:   08/22/17 110/64    Weight from Last 3 Encounters:   08/22/17 101.6 kg (224 lb)   06/27/17 77.1 kg (170 lb)              Today, you had the following     No orders found for display         Today's Medication Changes          These changes are accurate as of: 8/22/17 11:59 PM.  If you have any questions, ask your nurse or doctor.               Stop taking these medicines if you haven't already. Please contact your care team if you have questions.     OMEPRAZOLE PO   Stopped by:  Devang Lee MD           RIFAXIMIN PO   Stopped by:  Devang Lee MD                    Primary Care Provider Office Phone # Fax #    Narinder Lopez [phone]       [facility] [address]  [place] [address]        Equal Access to Services     [facility]: Hadii elizabeth shipley hadasho Soluis alberto, waaxda luqadaha, qaybta kaalmada aderaeyada, ethan alfaro . So [facility] [phone].    ATENCIÓN: Si habla español, tiene a garcia disposición servicios gratuitos de asistencia lingüística. Llame al [phone].    We comply with applicable federal civil rights laws and [place] laws. We do not discriminate on the basis of race, color, national origin, age, disability sex, sexual orientation or gender identity.            Thank you!     Thank you for choosing [facility] HEPATOLOGY  for your care. Our goal is always to provide you with excellent care. Hearing back from our patients is one way we can continue to improve our services. Please take a few minutes to complete the written survey that you may receive in the mail after your visit with us. Thank you!             Your Updated Medication List - Protect others around you: Learn how to safely use, store and throw away your medicines at www.disposemymeds.org.          This list is accurate as of: 8/22/17 11:59  PM.  Always use your most recent med list.                   Brand Name Dispense Instructions for use Diagnosis    BUMEX PO      Take 1 mg by mouth daily        lactulose 10 GM/15ML solution    CHRONULAC     Take 30 g by mouth 2 times daily        LEVOTHYROXINE SODIUM PO      Take 112 mcg by mouth daily        MAGNESIUM OXIDE PO      Take 500 mg by mouth 2 times daily        ONDANSETRON PO      Take 4 mg by mouth every 6 hours as needed for nausea        SPIRONOLACTONE PO      Take 50 mg by mouth daily        URSODIOL PO      Take 300 mg by mouth 2 times daily 2 tabs BID        vitamin A 39848 UNIT capsule      Take 10,000 Units by mouth daily

## 2017-08-23 NOTE — PROGRESS NOTES
"Assessment and Plan:  1. liver transplant evaluation - patient is a good candidate overall. Benefits and surgical risks of a liver transplantation were discussed.  2.  End stage liver disease due to primary biliary cirrohsis    Surgical evaluation:  1. Portal Vein:needs US  2. Hepatic Artery: needs US  3. TIPS: absent  4. Previous Abdominal Surgery: Yes - cholecystecomy  5. Hepatocellular Carcinoma: None  6. Ascites: Present - large amount  7. Costal Angle: wide  8. Portopulmonary Hypertension: absent  9. Hepatopulmonary Syndrome: absent  10. Cardiac Evaluation: adequate   11. Nutritional Status: Poor  12. Diabetes: none   13.Hypertension none  14. Smoker:no        Recommendations: 1.  Needs to complete full liver tx eval including lab work-up; 2. Imaging of portal system - US;       Patients overall evaluation will be discussed at the Liver Transplant selection committee meeting with a final recommendation on the patients suitability for transplant to be made at that time.    Consult Full  Details:  Maisha Syed was seen in consultation at the request of Dr. Moore for evaluation as a potential liver transplant recipient.    Reason for Visit:  Maisha Syed is a 66 year old year old lady with primary biliary cirrohsis, who presents for liver transplant evaluation.    Her dz is complicated by jaundice, ascites, sever lymphedema, HE and EVs.  She recently again was hospitalized with HE likely secondary to UTI.    ABO \"O\"        Past Medical History:   Diagnosis Date     Ascites 6/29/2017     Hepatic encephalopathy (H) 6/26/2017     Primary biliary cirrhosis (H) 6/26/2017     Past Surgical History:   Procedure Laterality Date     CHOLECYSTECTOMY       Past Surgical History:   Procedure Laterality Date     CHOLECYSTECTOMY       No family history on file.  No Known Allergies  Prior to Admission medications    Medication Sig Start Date End Date Taking? Authorizing Provider   Bumetanide (BUMEX PO) Take 1 mg by mouth daily "    Reported, Patient   lactulose (CHRONULAC) 10 GM/15ML solution Take 30 g by mouth 2 times daily    Reported, Patient   LEVOTHYROXINE SODIUM PO Take 112 mcg by mouth daily    Reported, Patient   MAGNESIUM OXIDE PO Take 500 mg by mouth 2 times daily    Reported, Patient   ONDANSETRON PO Take 4 mg by mouth every 6 hours as needed for nausea    Reported, Patient   SPIRONOLACTONE PO Take 50 mg by mouth daily    Reported, Patient   URSODIOL PO Take 300 mg by mouth 2 times daily 2 tabs BID    Reported, Patient   vitamin A 74068 UNIT capsule Take 10,000 Units by mouth daily    Reported, Patient       Previous Transplant Hx: No    Cardiovascular Hx:       h/o Cardiac Issues: No       Exercise Tolerance: no chest pain  Potential Donor(s): unknown  ROS:    REVIEW OF SYSTEMS (check box if normal)  [x]                GENERAL  [x]                  PULMONARY [x]                 GENITOURINARY  [x]                 CNS                 [x]                  CARDIAC  [x]                  ENDOCRINE  [x]                 EARS,NOSE,THROAT [x]                  GASTROINTESTINAL [x]                  NEUROLOGIC    [x]                 MUSCLOSKELTAL  [x]                   HEMATOLOGY    Examination:     Vitals:  There were no vitals taken for this visit.    GENERAL APPEARANCE: alert and no distress  EYES: PERRL  HENT: mouth without ulcers or lesions  NECK: supple, no adenopathy  RESP: lungs clear to auscultation - no rales, rhonchi or wheezes  CV: regular rhythm, normal rate, no rub   ABDOMEN:  soft, nontender, no HSM or masses and bowel sounds normal  MS: extensive LEs edema  SKIN: no rash  NEURO: Normal strength and tone, sensory exam grossly normal, mentation intact and speech normal  PSYCH: mentation appears normal. and affect normal/bright      Results: No results found for this or any previous visit (from the past 168 hour(s)).  I had a long discussion with the patient regarding liver transplantation which included but was not limited to   the following points:    1. Liver transplant selection committee process.  2. The federal rules for cadaveric waiting list, the size and blood type matching of the organ. The availability of living-related donor transplantation.  3. The types of donors: brain death donors, non-heart beating donors, partial liver grafts: splits and living donor grafts  4. Extended criteria  Donors (older age, steasosis) and the increased  risk of primary non-function using the extended criteria donors  5. The CDC high risk donors,  Risk of donor transmitted infections and donor transmitted malignancy  6. The liver transplant operation and the associated risks and technical complications which can include intraoperative death, post operative death,  Primary non-function, bleeding requiring re-operations, arterial and biliary complications, bowel perforations, and intra abdominal abscess. Some of these complicaitons may require a second operation.  7. The postoperative course, the ICU stay and risk of postoperative complications which can include sepsis, MI, stroke, brain injury, pneumonia, pleural effusions, and renal dysfunction.  8. The current 1 year and 5 year graft and patient survivals.  9. The need for life long immunosuppressive therapy and the side effects of these medications, including the possibility of toxicity, opportunistic infections, risk of cancer including lymphoma, and the possibility of rejection even if the patient is taking the medication exactly as prescribed.  10. The need for compliance with medications and follow-up visits in the clinic and thereafter.  11. The patient and family understand these risks and wish to proceed to transplantation       I spent 60 minutes with the patient and more than 50% of the time was spend in direct face to face counseling.

## 2017-08-24 NOTE — PROGRESS NOTES
[facility]    Hepatology New Patient Visit    Referring provider:  Arnoldo Moore      66 year old female    Chief complaint: candidacy for liver transplantation evaluation.     HPI:  I had the pleasure of seeing Ms. Maisha Syed in the Liver Transplantation Clinic at the [facility] on 08/22/2017.  We are seeing Ms. 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  Ms. Maisha Syed was originally diagnosed with PBC approximately 10 years ago when she had a screening physical exam that revealed a hepatic panel with an elevated alkaline phosphatase.  She had a long period of well-controlled disease on ursodiol for several years, but, unfortunately, in the past few months her disease has decompensated significantly.  The first sign of decompensation was the development of ascites and really marked lower extremity edema, increasing fatigue, and difficulty in ambulation.  She additionally, over that same period of time, has had several hospitalizations for hepatic encephalopathy.  At least one of the hospitalizations was provoked by a UTI.  The other 2 hospitalizations sound like they were early on in the course of her diagnosis before she had adequate pharmacologic therapy.  She has had some ascites as well that did require a tap, but, again, the majority of her fluid accumulation has been in her lower extremities.  More recently, she has also had the development of large esophageal varices, which did require banding, and a significant decompensation in her labs predominantly in the form of a bilirubin elevation.  She apparently has had a structural examination for causes of obstruction, and that has returned negative.  Given the significant decompensation of liver function tests, in addition to the development of significant amounts of lower extremity edema and hepatic encephalopathy, she is  referred today for consideration for liver transplantation.  She does state that she has had a marked decrease in her amount of energy and ability to ambulate more recently, but otherwise feels relatively well considering the severity of her disease.  She specifically denies any significant confusion, unless she does miss a dose of her medication, in which case she does become rather lethargic.  Her  is around the house to ensure that the medications are administered on a regular basis.  She also has noticed that her eyes are increasingly yellow over the past several weeks as well.  She has developed some mild itching; although, she does not endorse any significant impingement on her daily ability to function.  As I mentioned previously, she has been hospitalized multiple times at [facility] for hepatic encephalopathy in the past few months.  For a long period of time she has been under the care of a general provider at [facility], but most recently has seen Dr. Arnoldo Moore, who then subsequently recommended that she be seen in the Transplant Center given her decompensation.  She does live at home with her , and she has a good social support system.  She does not consume alcohol.  She is a nonsmoker, and has never used illicit drugs.  She is relatively compliant with her medications, and does take her lactulose regularly; although, at times she does require some reminding from her  as well.  She also takes a stable dose of Bumex and spironolactone at 1 and 50, respectively, for the management of lower extremity edema and ascites.  It is worth noting that this has been increasingly difficult, and the dose has been down-titrated due to the development of renal insufficiency as well.  Her other medical problems are significant for some hypothyroid for which she takes a stable dose of levothyroxine, and some mild nausea for which she takes occasional ondansetron.  She is also  notably on ursodiol 600 mg b.i.d. for her PBC long-term.        No history of melena, hematemesis or hematochezia.    Patient denies fevers, sweats, chills or weight loss.    Medical hx Surgical hx   Past Medical History:   Diagnosis Date     Ascites 6/29/2017     Hepatic encephalopathy (H) 6/26/2017     Primary biliary cirrhosis (H) 6/26/2017      Past Surgical History:   Procedure Laterality Date     CHOLECYSTECTOMY            Medications  Prior to Admission medications    Medication Sig Start Date End Date Taking? Authorizing Provider   Bumetanide (BUMEX PO) Take 1 mg by mouth daily    Reported, Patient   lactulose (CHRONULAC) 10 GM/15ML solution Take 30 g by mouth 2 times daily    Reported, Patient   LEVOTHYROXINE SODIUM PO Take 112 mcg by mouth daily    Reported, Patient   MAGNESIUM OXIDE PO Take 500 mg by mouth 2 times daily    Reported, Patient   ONDANSETRON PO Take 4 mg by mouth every 6 hours as needed for nausea    Reported, Patient   SPIRONOLACTONE PO Take 50 mg by mouth daily    Reported, Patient   URSODIOL PO Take 300 mg by mouth 2 times daily 2 tabs BID    Reported, Patient   vitamin A 78420 UNIT capsule Take 10,000 Units by mouth daily    Reported, Patient       Allergies  No Known Allergies       Family hx Social hx   No family history of inflammatory bowel disease or GI malignancies   Social History   Substance Use Topics     Smoking status: Never Smoker     Smokeless tobacco: Never Used     Alcohol use No          Review of systems  A 10-point review of systems was negative.    Examination  There were no vitals taken for this visit.  There is no height or weight on file to calculate BMI.    Gen- well, NAD, A+Ox3, normal color  Eye- EOMI  ENT- MMM, normal oropharynx  Lym- no palpable lymphadenopathy  CVS- S1, S2 normal, no added sounds, RRR  RS- CTA  Abd- distended, BS+, liver edge palpable 2 cm below costal margin  Extr- 4+++ LE edema, pitting to pelvis  MS- hands normal- no clubbing  Neuro- A+Ox3,  no asterixis  Skin- no rash or jaundice  Psych- normal mood    Laboratory  No results found for: NA, POTASSIUM, CHLORIDE, CO2, BUN, CR    No results found for: BILITOTAL, ALT, AST, ALKPHOS    No results found for: ALBUMIN, PROTTOTAL     No results found for: WBC, HGB, MCV, PLT    No results found for: INR      Radiology  Her most recent ultrasound does reveal some stable mild intra and extrahepatic biliary dilatation which was thought to be likely reservoir effect from prior cholecystectomy.  It was also noted that she does have kit cirrhosis although there is no focal intrahepatic mass seen.  There was also a small amount of ascites seen, although that was present in all 4 quadrants.  She also had a recent echo 08/01 of this year for evaluation of this increasing lower extremity edema, which revealed a normal LV in size and function with EF 65%.  It also revealed that the RV was normal in size and systolic function and they also noticed that the pulmonary pressures were estimated to be normal as well.  There was some mild mitral valve regurgitation and some mild tricuspid valve regurgitation, but again, there is no pulmonary hypertension present.       ASSESSMENT AND PLAN:  My overall assessment is that Ms. Maisha Syed is a 66-year-old female with advanced and decompensated PBC cirrhosis.  Her decompensation is predominantly manifested with the development of esophageal varices, hepatic encephalopathy, significant ascites and lower extremity edema.  She has not yet had SBP, and her portal vein is patent.  However, her MELD when we calculated, based on the most recent labs available from [facility], was at 25, and this is a significant increase from her pervious MELD, which was always in the 13-19 range.  Given his rate of decompensation, which does not have a clear foci for approximate cause, she certainly is worth considering for listing for transplantation in the setting of multiple things;  decompensation and high MELD.  It does appear that she has a good social support system with her  primarily and several family and friends in the area.  She also appears able to be compliant with her medications, and is aware of the implications and certainly the potential complications of transplantation as well.  Given the combination of her high MELD, multiple signs of decompensation and a rather rapid rate of progression of her disease, we are recommending that she undergo full and expeditious evaluation for transplant candidacy, including seeing our surgeon, social workers, care coordinators, cardiologist, pulmonary doctors and otherwise.  We will plan on expediting this evaluation with the assistance of our transplant coordinator, Devon Johnston, and start this as soon as possible.  We will re-discuss her after the evaluation is complete.  I do anticipate that she would be a reasonable candidate for transplantation pending any surprises in the evaluation process.   Dictated by Desmond Patton MD, Fellow       Patient seen and discussed with Dr. Lee    The patient was seen and examined.  The above assessment and plan was developed jointly with the fellow.     More than 50% of the 60' visit was spent in face-to-face discussion regarding the benefits of liver transplantation, the process for listing, and the long-term care and outcomes of liver transplanation      Devang Lee MD      Professor of Medicine  [employer]      Executive Medical Director, Solid Organ Transplant Program  [facility]

## 2017-09-07 NOTE — TELEPHONE ENCOUNTER
Received request to offer patient dates of  9/25/17 & 9/26/17 for Liver Transplant Evaluation Appointments. LVM for patient with my call back info.

## 2017-09-08 NOTE — Clinical Note
Shena, as you know, do not schedule  Cindy, please send letter to referring that she declines transplant johanna VALDEZ

## 2017-09-08 NOTE — TELEPHONE ENCOUNTER
Patient came in as a consult with Dr. Lee recently, the plan after that appt was to move forward with a full evaluation.      called Maisha this am to get eval scheduled, Maisha was passed to me, and states she has had long discussions with her family, and she does NOT wish to proceed with transplant. She feels that is the best decision for her right now.     Will cancel plan for evaluation, and close transplant episode. She is aware she can call at anytime if anything changes.

## 2017-11-27 ENCOUNTER — HOME CARE/HOSPICE - HEALTHEAST (OUTPATIENT)
Dept: HOME HEALTH SERVICES | Facility: HOME HEALTH | Age: 67
End: 2017-11-27

## 2020-12-14 NOTE — NURSING NOTE
Chief Complaint   Patient presents with     Allied Health Visit     liver consult       Initial /64  Pulse 77  Temp 98  F (36.7  C) (Oral)  Resp 16  Ht 1.524 m (5')  Wt 101.6 kg (224 lb)  SpO2 97%  BMI 43.75 kg/m2 Estimated body mass index is 43.75 kg/(m^2) as calculated from the following:    Height as of this encounter: 1.524 m (5').    Weight as of this encounter: 101.6 kg (224 lb).  Medication Reconciliation: complete     resilient/elastic

## 2021-05-25 ENCOUNTER — RECORDS - HEALTHEAST (OUTPATIENT)
Dept: ADMINISTRATIVE | Facility: CLINIC | Age: 71
End: 2021-05-25

## 2021-05-28 ENCOUNTER — RECORDS - HEALTHEAST (OUTPATIENT)
Dept: ADMINISTRATIVE | Facility: CLINIC | Age: 71
End: 2021-05-28

## 2021-05-29 ENCOUNTER — RECORDS - HEALTHEAST (OUTPATIENT)
Dept: ADMINISTRATIVE | Facility: CLINIC | Age: 71
End: 2021-05-29

## 2021-05-30 VITALS — HEIGHT: 61 IN | BODY MASS INDEX: 44.75 KG/M2 | WEIGHT: 237 LBS

## 2021-05-31 VITALS — HEIGHT: 61 IN | BODY MASS INDEX: 40.97 KG/M2 | WEIGHT: 217 LBS

## 2021-05-31 VITALS — HEIGHT: 61 IN | WEIGHT: 167 LBS | BODY MASS INDEX: 31.53 KG/M2

## 2021-06-08 NOTE — PROGRESS NOTES
Nurse Visit      Date of Service:1/26/2017    Chief Complaint: Patient presents to clinic for evaluation of their swelling and ulcers    Dressing on Arrival : ABD pads and 2-layer lites (both wraps had slippage to mid calf)      Allergies: Review of patient's allergies indicates no known allergies.    Assessment:    Visit Vitals     /86     Pulse 72     Temp 98.4  F (36.9  C) (Oral)     Resp 16       General:  Patient presents to clinic in no apparent distress.  Psychiatric:  Alert and oriented x3.   Lower extremity:  edema is present.    Integumentary:  Skin is uniformly warm, dry and pink.    Wound size:   Wound 01/10/17 Left medial shin (Active)   Pre Size Length 1.5 1/17/2017  2:00 PM   Pre Size Width 2 1/17/2017  2:00 PM   Pre Size Depth 0.2 1/17/2017  2:00 PM   Pre Total Sq cm 3 1/17/2017  2:00 PM   Prodcut Used Collagen;ABD Pad 1/17/2017  2:00 PM       Wound 01/10/17 Right medial (Active)   Pre Size Length Appears healed 1/17/2017  2:00 PM   Pre Size Width  1/17/2017  2:00 PM   Pre Total Sq cm  1/17/2017  2:00 PM   Prodcut Used  1/17/2017  2:00 PM       Wound 01/10/17 Right shin (Active)   Pre Size Length 0 1/17/2017  2:00 PM   Pre Size Width 0 1/17/2017  2:00 PM   Pre Total Sq cm 0 1/17/2017  2:00 PM   Prodcut Use  1/17/2017  2:00 PM       Wound Right posterior calf (Active   Pre Size Length Appears healed 1/17/2017  2:00 PM   Pre Size Width  1/17/2017  2:00 PM   Pre Total Sq cm  1/17/2017  2:00 PM   Prodcut Used  1/17/2017  2:00 PM      Undermining none.    The periwound skin is WNL. Wounds washed with mycrocyn/legs washed and lotion applied      Plan:         1. Patient is beginning homecare sometime in the next few days. Will follow up with Dr. Ochoa on 2/13          2.  Wound treatment:Microcyn, endoform,adaptic (to open areas).,Abd,2 layer lite bilaterally Tolerated well. The only measureable open wound was on left medial shin. The others are either scabbed over or healed. Right  posterior leg wound noted to be pink and granular in appearance.          3.  Patient will follow up with Dr. Ochoa on 2/13

## 2021-06-08 NOTE — PROGRESS NOTES
Wound product removed: 2 layer lite on right and endoform and adaptic    Treatment provided:    Cleansed with: normal saline  Protected skin with: Remedy lotion to intact skin  Applied: endoform to left leg wound and adaptic touch to right leg  Packed/filled with: NA  Covered with: abd pad and roll gauze  Secured with: 2 layer lite compression wrap to bilateral legs    Patient came in today for dressing change and 2 layer re wrap per order from Dr. Ochoa. Patient rated pain 4 out of 10 but last night stated pain was 8 out of 10 in right foot.  removed wrap due to the pain last night around 8 pm. Removed 2 layer and dressings and on left leg cleansed skin with Remedy skin cleanser and cleaned wound bed with normal saline. Applied lotion to intact skin.  Measured legs and documented in doc flow sheet. Applied endoform and adaptic touch to left leg wound and covered with an abd pad and secured with roll gauze. Applied adaptic to right leg and covered an abd and secured with roll gauze. Re wrapped 2 layer lite compression wrap to bilateral legs. Patient tolerated dressing change well. Will follow up Tuesday for a visit with the nurse.

## 2021-06-08 NOTE — PROGRESS NOTES
Date of Service: 02/13/17    Date last seen:  01/10/2017    PCP: Narinder Lopez MD    Impression:  1. Bilateral leg swelling dependent with aching  2. Lipedema  3. Venous hypertension and insufficiency and ulcerations improving-left medial calf and new right ant ankle  4. Lymphedema with hyperkeratosis and skin fibrosis in distal toes.  5. Primary biliary cirrhosis  6. Morbid obesity  7.  Vitamin A deficiency    Plan:  1.  Questions were answered.  present.   2.  If wound improve then continue exercise and velcro compression.   3.  Will eventually restart intermittent compression pump for home.  I will address this at her follow-up visit.  4.  After permission was obtained 2% Lidocaine HCL jelly was applied, under clean conditions, the bilateral, calves and venous stasis/insufficiency with venous hypertension ulceration(s) were debrided using currette.  Devitalized and non viable tissue, along with any fibrin and slough, was removed to improve granulation tissue formation, stimulate wound healing, decrease overall bacteria load, disrupt biofilm formation and decrease edge senescence.  Total excisional debridement was 0.2 sq cm into the subcutaneous tissue.   Ulcers were  improved afterwards and .  Measures were unchanged after debridement .  5.  Left calf: alginate then abd then two layer lite.  Right anterior ankle:  endoform then foam then 2 layer lite.  May use dimethicone based lotion on unopened skin.  6.  Take vit A 10,000 mg BID as tolerated.  7.  Can do dressing changes once a week now.  Has [facility].  8.  Vit D and C were normal.  9.  Follow up with me, or CNP in 1 month.    Time spent with patient 15 minutes with greater than 50% time in consultation, education and coordination of care.   ---------------------------------------------------------------------------------------------------------------------    Chief Complaint: Bilateral leg swelling with leg weeping.    History of Present  Illness:  Maisha Syed returns to the [facility] for follow up of bilateral leg swelling with leg ulcerations.  She's been getting 2 layer compression which is been being changed at home twice a week..  She feels the ulcerations are healing.  The weeping has decreased greatly.  The legs feel better.  She like to go back to her compression pumping.  She has lipedema with leg swelling secondary to primary biliary cirrhosis.   She is here for follow-up today. She is not getting any new shortness of breath. There has been no new numbness, tingling, weakness, masses, rashes, shortness of breath or chest pain.  There has been no new fevers or any increasing pain.       Past Medical History:   Diagnosis Date     Cirrhosis, biliary      GERD (gastroesophageal reflux disease)      Hypothyroid      Liver disease      Lymphedema        Past Surgical History:   Procedure Laterality Date     CHOLECYSTECTOMY  2005     ESOPHAGOGASTRODUODENOSCOPY N/A 12/8/2016    Procedure: ESOPHAGOGASTRODUODENOSCOPY;  Surgeon: Gamaliel Calvert MD;  Location: [facility];  Service:          Current Outpatient Prescriptions:      furosemide (LASIX) 20 MG tablet, Take 20 mg by mouth 2 (two) times a day at 9am and 6pm. , Disp: , Rfl: 2     levothyroxine (SYNTHROID, LEVOTHROID) 112 MCG tablet, Take 112 mcg by mouth Daily at 6:00 am. , Disp: , Rfl:      nystatin (MYCOSTATIN) cream, Apply 1 application topically 2 (two) times a day as needed., Disp: , Rfl:      omeprazole (PRILOSEC) 40 MG capsule, Take 40 mg by mouth 2 (two) times a day., Disp: , Rfl:      spironolactone (ALDACTONE) 50 MG tablet, Take 50 mg by mouth 2 (two) times a day at 9am and 6pm., Disp: , Rfl:      ursodiol (ACTIGALL) 300 mg capsule, Take 600 mg by mouth 2 (two) times a day. , Disp: , Rfl:      vitamin A 99377 UNIT capsule, Take 1 capsule (25,000 Units total) by mouth daily. 4 capsules daily for 3 days then 2 capsules daily for 14 days., Disp: 40 capsule,  Rfl: 0    Current Facility-Administered Medications:      lidocaine 2 % jelly (XYLOCAINE), , Topical, PRN, Xenia Ochoa MD, 1 application at 02/13/17 1531    No Known Allergies    Social History     Social History     Marital status:      Spouse name: N/A     Number of children: N/A     Years of education: N/A     Occupational History     Not on file.     Social History Main Topics     Smoking status: Never Smoker     Smokeless tobacco: Never Used     Alcohol use No     Drug use: No     Sexual activity: Yes     Partners: Male     Birth control/ protection: Post-menopausal, Surgical     Other Topics Concern     Not on file     Social History Narrative       Family History   Problem Relation Age of Onset     Varicose Veins Sister      Edema Sister      Edema Mother        Review of Systems:  Maisha A White no new numbess, tingling or weakness, redness or rashes, fevers, new masses, abdominal bloating or discomfort, unexplained weight loss, new ulcers, shortness of breath and chest pain  Full 12 point review of systems was completed.      Physical Exam:  Vitals:    02/13/17 1519   BP: 116/68   Pulse: 84   Resp: 16   Temp: 98.1  F (36.7  C)    There is no height or weight on file to calculate BMI.    Circumferential measures:    Vasc Edema 1/12/2017 1/17/2017 1/24/2017 1/26/2017 2/13/2017   Right just above MTP 25.2 25.5 25.0 23 23.5   Right Ankle 35.2 33.0 32.0 33.3 32.5   Right Widest Calf 74.2 74.0 76.0 74.8 68.9   Right Thigh Up 10cm - - - - -   Left - just above MTP 23.8 24.5 23.5 22 22.8   Left Ankle 34.4 35.0 34.0 33.6 32   Left Widest Calf 69.8 73.0 75.0 73.4 67   Left Thigh Up 10cm - - - - -     Measures much improved.    General:  66 y.o. female in no apparent distress.  Alert and oriented x 3.  Cooperative. Affect normal.     Integumentary: Skin of the legs is hyperpigmented in the legs bilaterally.  There is significant fibrosis and scarring with hyperkeratosis.  There is the shininess to the  skin.  There is +2 pitting edema below the knees bilaterally.  This has decreased.  There are no significant small ulcerations now.  The weepiness has decreased bilaterally.  The 2 main ulcerations reveal total healing on the right calf with much improvement on the left medial calf.  There is a new ulceration on the right anterior ankle.  There is no rubor or calor.  There is no pain to palpation.  There is no odor.  There is a very small amount of serous drainage.  The granulation tissue is much improved.  The right anterior ankle has poor granulation tissue with significant slough and fibrin.  There is no undermining.  This goes and the subcu.  Please see the flow sheet for measures.        Xenia Ochoa MD, ABWMS, FACCWS, [facility]  Medical Director Wound Care and Lymphedema  [facility]  [phone]

## 2021-06-08 NOTE — PROGRESS NOTES
Nurse Visit      Date of Service:1/24/2017    Chief Complaint: Patient presents to clinic for evaluation of their swelling and ulcers    Dressing on Arrival : ABD pads and ace wrap. (Had to remove 2-layer lite due to soaking through)      Allergies: Review of patient's allergies indicates no known allergies.    Assessment:    Visit Vitals     /76 (Patient Position: Sitting)     Pulse 82     Temp 98.2  F (36.8  C) (Oral)     Resp 16       General:  Patient presents to clinic in no apparent distress.  Psychiatric:  Alert and oriented x3.   Lower extremity:  edema is present.    Integumentary:  Skin is uniformly warm, dry and pink.    Wound size:   Wound 01/10/17 Left medial shin (Active)   Pre Size Length 1.5 1/17/2017  2:00 PM   Pre Size Width 2 1/17/2017  2:00 PM   Pre Size Depth 0.2 1/17/2017  2:00 PM   Pre Total Sq cm 3 1/17/2017  2:00 PM   Prodcut Used Collagen;ABD Pad 1/17/2017  2:00 PM       Wound 01/10/17 Right medial (Active)   Pre Size Length 1 1/17/2017  2:00 PM   Pre Size Width 4 1/17/2017  2:00 PM   Pre Total Sq cm 4 1/17/2017  2:00 PM   Prodcut Used Collagen;ABD Pad 1/17/2017  2:00 PM       Wound 01/10/17 Right shin (Active)   Pre Size Length 1 1/17/2017  2:00 PM   Pre Size Width 1.2 1/17/2017  2:00 PM   Pre Total Sq cm 1.2 1/17/2017  2:00 PM   Prodcut Used ABD Pad;Collagen 1/17/2017  2:00 PM       Wound Right posterior calf (Active)   Pre Size Length 2 1/17/2017  2:00 PM   Pre Size Width 2 1/17/2017  2:00 PM   Pre Total Sq cm 4 1/17/2017  2:00 PM   Prodcut Used ABD Pad;Collagen 1/17/2017  2:00 PM      Undermining none.    The periwoundskin is WNL. Wounds washed with mycrocyn/legs washed and lotion applied      Plan:         1. Patient to bring in new dressings ordered from [facility]. They were reminded of this today Spoke with patient and  at length today regarding ordering homecare. Patient's dressings are weeping through and need to be removed about every 2 days according to .  They report the only time she leaves home is for appointments.  Will discuss with Dr. Ochoa ordering home care for patient.          2.  Wound treatment:Sorbion,adaptic,collagen,abd,2 layer lite. Tolerated well.         3.  Patient will follow up in Thursday for nurse visit.

## 2021-06-08 NOTE — PROGRESS NOTES
Patient hospitalized for leg swelling ,cellulitis and wound bilateral lower legs. Weeping noted from left thigh also. Notified Rozina at Primary office about vit lab draw and patient will need to fast. Has apt tomorrow. Fax order to [phone] Attn Rosangela.

## 2021-06-08 NOTE — PROGRESS NOTES
Date of Service: 01/10/17    Date last seen:  08/17/2016    PCP: Narinder Lopez MD    Impression:  1. Bilateral leg swelling dependent with aching  2. Lipedema  3. Venous hypertension and insufficiency and ulcerations worsening with increasing fluid retention.  Ulcers are new.  4. Lymphedema with hyperkeratosis and skin fibrosis in distal toes.  5. Primary biliary cirrhosis  6. Morbid obesity  7. High risk nutritional deficiencies which can contribute to poor healing and decreased immune response    Plan:  1.  Questions were answered.  present.   2.  If wound improve then continue exercise and velcro compression.   3.  Will eventually restart intermittent compression pump for home.  I will address this at her follow-up visit.  4.  After permission was obtained 2% Lidocaine HCL jelly was applied, under clean conditions, the bilateral, calves and venous stasis/insufficiency with venous hypertension ulceration(s) were debrided using currette.  Devitalized and non viable tissue, along with any fibrin and slough, was removed to improve granulation tissue formation, stimulate wound healing, decrease overall bacteria load, disrupt biofilm formation and decrease edge senescence.  Total excisional debridement was 7 sq cm into the subcutaneous tissue.   Ulcers were  improved afterwards and .  Measures were unchanged after debridement .  5.  Left calf: Microcyn then endoform, then abd then two layer lite.  Right leg:  Adaptic then ABD then 2 layer lite.  May use dimethicone based lotion on unopened skin.  6.  Seeing GI on the 16th.  If any way fluid retention can be better controlled that would be great. Very difficult problem.   Dr. Lopez seeing tomorrow.  7.  Patient will follow up in twice a week with nursing X 2 weeks then with me 1 week after that.  Family to remove 2 layer, if any increasing shortness of breath.  If family has a hard time taking care of her we can switch to home health care.  This  was discussed with them.  They would prefer to come in for visits at this time.  8.  Will ask Dr. Lopez and Enrike to order Vit D, C, A  Blood levels with any blood draws they order, so she doesn't have to have additional blood stick here.     Time spent with patient 15 minutes with greater than 50% time in consultation, education and coordination of care.   ---------------------------------------------------------------------------------------------------------------------    Chief Complaint: Bilateral leg swelling with leg weeping.    History of Present Illness:  Maisha Syed returns to the [facility] for follow up of bilateral leg swelling. She has lipedema with leg swelling secondary to primary biliary cirrhosis. She was wearing her velcro compression which was working well and using acompression pump and this is helping considerably.  Unfortunately, she has primary biliary cirrhosis and continues to have more and more problems with fluid retention and liver disease.  She was recently in the hospital for increasing weepiness and redness of the legs.  She was discharged January 5, 2017.  She is here for follow-up today.  It is not felt that there was an underlying infection.  She has new ulcerations on the legs.  This is what brought him to the hospital.  She was getting a lot of weeping.  Get the swelling down at the hospital.  Now since she's been home the family has been doing the bandaging.  However the swelling is starting to go up again.  She is not getting any shortness of breath.  She is scheduled to see gastroenterology in 6 days.  She is scheduled to see Dr. Lopez she does complain of fatigue.  They are using just Abd pads on the ulcerations.   There has been no new numbness, tingling, weakness, masses, rashes, shortness of breath or chest pain.  There has been no new fevers or any increasing pain.       Past Medical History   Diagnosis Date     Cirrhosis, biliary      GERD  (gastroesophageal reflux disease)      Hypothyroid      Liver disease      Lymphedema        Past Surgical History   Procedure Laterality Date     Cholecystectomy  2005     Esophagogastroduodenoscopy N/A 12/8/2016     Procedure: ESOPHAGOGASTRODUODENOSCOPY;  Surgeon: Gamaliel Calvert MD;  Location: [facility];  Service:          Current Outpatient Prescriptions:      furosemide (LASIX) 20 MG tablet, Take 20 mg by mouth 2 (two) times a day at 9am and 6pm. , Disp: , Rfl: 2     levothyroxine (SYNTHROID, LEVOTHROID) 112 MCG tablet, Take 112 mcg by mouth Daily at 6:00 am. , Disp: , Rfl:      nystatin (MYCOSTATIN) cream, Apply 1 application topically 2 (two) times a day as needed., Disp: , Rfl:      omeprazole (PRILOSEC) 40 MG capsule, Take 40 mg by mouth 2 (two) times a day., Disp: , Rfl:      spironolactone (ALDACTONE) 50 MG tablet, Take 50 mg by mouth 2 (two) times a day at 9am and 6pm., Disp: , Rfl:      ursodiol (ACTIGALL) 300 mg capsule, Take 600 mg by mouth 2 (two) times a day. , Disp: , Rfl:     Current Facility-Administered Medications:      lidocaine 2 % jelly (XYLOCAINE), , Topical, PRN, Xenia Ochoa MD, 1 application at 01/10/17 0931    No Known Allergies    Social History     Social History     Marital status:      Spouse name: N/A     Number of children: N/A     Years of education: N/A     Occupational History     Not on file.     Social History Main Topics     Smoking status: Never Smoker     Smokeless tobacco: Never Used     Alcohol use No     Drug use: No     Sexual activity: Yes     Partners: Male     Birth control/ protection: Post-menopausal, Surgical     Other Topics Concern     Not on file     Social History Narrative       Family History   Problem Relation Age of Onset     Varicose Veins Sister      Edema Sister      Edema Mother        Review of Systems:  Maisha Syed no new numbess, tingling or weakness, redness or rashes, fevers, new masses, abdominal bloating or  discomfort, unexplained weight loss, new ulcers, shortness of breath and chest pain  Full 12 point review of systems was completed.      Physical Exam:  Vitals:    01/10/17 0918   BP: 112/66   Pulse: 66   Resp: 16   Temp: 97.3  F (36.3  C)    There is no height or weight on file to calculate BMI.    Circumferential measures:    Vasc Edema 2/15/2016 5/2/2016 8/17/2016 1/10/2017   Right just above MTP 21.5 24 23.8 23.5   Right Ankle 32.0 32.2 35.5 31.5   Right Widest Calf 61.0 63.5 59.3 73.5   Right Thigh Up 10cm 72.0 - - -   Left - just above MTP 21.4 23 25 23.5   Left Ankle 31.0 33.5 33.5 33.0   Left Widest Calf 61.0 63 58.7 71.0   Left Thigh Up 10cm 72.5 - - -     Measures up.    General:  66 y.o. female in no apparent distress.  Alert and oriented x 3.  Cooperative. Affect normal.    Lungs: Clear to auscultation throughout with full inspiration.    CV: Normal S1 and S2, without murmurs, gallops, or rubs.  No audible carotid bruits. Regular rhythm.    Abdominal:  Normal bowel sounds.  No masses, tenderness, guarding or rigidity.  No inguinal lymphadenopathy or fullness palpated.      Musculoskeletal:  Normal range of motion in, knees and ankles bilaterally throughout .  There is no active joint synovitis, erythema, swelling or joint laxity.      Neurological:  Sensation is intact to pin prick and light touch in both legs.  Strength testing is normal in hip flexion, knee flexion, knee extension ankle dorsiflexion and great toe extension bilaterally.      Vascular: Dorsalis pedis and posterior tibialis pulses are strong and equal bilaterally. There are some telangietasias, medial ankle venous flares, venous varicosities  and spider veins . There is normal capillary refill.     Integumentary: Skin of the legs is hyperpigmented in the legs bilaterally.  There is significant fibrosis and scarring with epic keratosis.  There is the shininess to the skin.  There is +2-3 pitting edema below the knees bilaterally.  There  are multiple small weeping areas throughout the legs.  There are 2 main ulcerations.  On the right distal medial posterior calf there is an ulceration which is fairly superficial with no rubor or calor.  There is no pain to palpation.  There is no odor.  There is serous drainage.  On the left medial distal anterior calf there is a larger ulceration with significant slough and fibrin.  There is poor granulation tissue.  There is no undermining.  This goes and the subcu.  Please see the flow sheet for measures.  Please see the photos below.      Xenia Ochoa MD, ABWMS, FACCWS, [facility]  Medical Director Wound Care and Lymphedema  [facility]  [phone]

## 2021-06-09 NOTE — PROGRESS NOTES
Date of Service: 03/15/17    Date last seen:  02/13/2017    PCP: Narinder Lopez MD    Impression:  1. Bilateral leg swelling dependent with aching  2. Lipedema  3. Venous hypertension and insufficiency and ulcerations improving-left lateral calf ulcer  4. Lymphedema with hyperkeratosis and skin fibrosis in distal toes.  5. Primary biliary cirrhosis  6. Morbid obesity  7. Vitamin A deficiency    Plan:  1.  Questions were answered.  present.   2.  If wound improve then continue exercise and velcro compression.   3.  Can restart intermittent compression pump for home.   4.  After permission was obtained 2% Lidocaine HCL jelly was applied, under clean conditions, the left lateral calf ulcerations were debrided using currette.  Devitalized and non viable tissue, along with any fibrin and slough, was removed to improve granulation tissue formation, stimulate wound healing, decrease overall bacteria load, disrupt biofilm formation and decrease edge senescence.  Total excisional debridement was 3 sq cm into the subcutaneous tissue.   Ulcers were  improved afterwards and .  Measures were unchanged after debridement .  5.  Left calf: alginate with silver then abd then two layer lite on left.  Once therapy starts at home can incorporate wound care into therapy bandaging.  Has [facility].  Therapy orders clarified.   6.  Follow up in 2-3 weeks with me or CNP.  7.  Vit A level to see if she continues to need it.       Time spent with patient 15 minutes with greater than 50% time in consultation, education and coordination of care.   ---------------------------------------------------------------------------------------------------------------------    Chief Complaint: Bilateral leg swelling with leg weeping.    History of Present Illness:  Maisha Syed returns to the [facility] for follow up of bilateral leg swelling with leg ulcerations.  She's been getting 2 layer compression which is been being  changed at home twice a week.  She feels the ulcerations continue to heal.  The weeping has decreased greatly.   She like to go back to her compression pumping.  She has lipedema with leg swelling secondary to primary biliary cirrhosis.   She is here for follow-up today. She is not getting any new shortness of breath. There has been no new numbness, tingling, weakness, masses, rashes, shortness of breath or chest pain.  There has been no new fevers or any increasing pain.  She is very excited that she feels the legs are significantly better.  She is starting to exercise more.  She feels she is more independent.  The ulcerations on the right leg if healed.  She still has some small ones on the left lateral calf which are more towards the knee.  She continues to be on the vitamin a which was started at the hospital.      Past Medical History:   Diagnosis Date     Cirrhosis, biliary      GERD (gastroesophageal reflux disease)      Hypothyroid      Liver disease      Lymphedema        Past Surgical History:   Procedure Laterality Date     CHOLECYSTECTOMY  2005     ESOPHAGOGASTRODUODENOSCOPY N/A 12/8/2016    Procedure: ESOPHAGOGASTRODUODENOSCOPY;  Surgeon: Gamaliel Calvert MD;  [place] [facility]  Service:          Current Outpatient Prescriptions:      bumetanide (BUMEX) 1 MG tablet, Take 1 tablet (1 mg total) by mouth 2 (two) times a day at 9am and 6pm., Disp: 60 tablet, Rfl: 0     levothyroxine (SYNTHROID, LEVOTHROID) 112 MCG tablet, Take 112 mcg by mouth Daily at 6:00 am. , Disp: , Rfl:      magnesium oxide (MAG-OX) 400 mg tablet, Take 1 tablet (400 mg total) by mouth daily. (Patient taking differently: Take 400 mg by mouth daily. Patient taking (2) tablets of the 250 mg   Indications: Hypomagnesemia), Disp: , Rfl: 0     nystatin (MYCOSTATIN) cream, APPLY 3 TIMES DAILY AS NEEDED FOR SKIN CANDIDIASIS, Disp: , Rfl: 1     omeprazole (PRILOSEC) 40 MG capsule, Take 40 mg by mouth 2 (two) times a day., Disp:  , Rfl:      spironolactone (ALDACTONE) 50 MG tablet, Take 3 tablets (150 mg total) by mouth daily., Disp: 90 tablet, Rfl: 0     ursodiol (ACTIGALL) 300 mg capsule, Take 600 mg by mouth 2 (two) times a day. , Disp: , Rfl:      vitamin A 22034 UNIT capsule, Take 10,000 Units by mouth 2 (two) times a day., Disp: , Rfl:     Current Facility-Administered Medications:      lidocaine 2 % jelly (XYLOCAINE), , Topical, PRN, Xenia Ochoa MD, 1 application at 03/15/17 1357    No Known Allergies    Social History     Social History     Marital status:      Spouse name: N/A     Number of children: N/A     Years of education: N/A     Occupational History     Not on file.     Social History Main Topics     Smoking status: Never Smoker     Smokeless tobacco: Never Used     Alcohol use No     Drug use: No     Sexual activity: Yes     Partners: Male     Birth control/ protection: Post-menopausal, Surgical     Other Topics Concern     Not on file     Social History Narrative       Family History   Problem Relation Age of Onset     Varicose Veins Sister      Edema Sister      Edema Mother      Heart attack Father      Heart attack Brother      Stomach cancer Sister      No Medical Problems Sister      No Medical Problems Sister        Review of Systems:  Maisha A White no new numbess, tingling or weakness, redness or rashes, fevers, new masses, abdominal bloating or discomfort, unexplained weight loss, new ulcers, shortness of breath and chest pain  Full 12 point review of systems was completed.      Physical Exam:  Vitals:    03/15/17 1348   BP: 108/56   Pulse: 80   Resp: 18   Temp: 99.4  F (37.4  C)    There is no height or weight on file to calculate BMI.    Circumferential measures:    Vasc Edema 1/17/2017 1/24/2017 1/26/2017 2/13/2017 3/15/2017   Right just above MTP 25.5 25.0 23 23.5 23.0   Right Ankle 33.0 32.0 33.3 32.5 30.0   Right Widest Calf 74.0 76.0 74.8 68.9 72.0   Right Thigh Up 10cm - - - - -   Left -  just above MTP 24.5 23.5 22 22.8 23.0   Left Ankle 35.0 34.0 33.6 32 31.0   Left Widest Calf 73.0 75.0 73.4 67 69.0   Left Thigh Up 10cm - - - - -     Measures much improved.    General:  66 y.o. female in no apparent distress.  Alert and oriented x 3.  Cooperative. Affect normal.     Integumentary: Skin of the legs is hyperpigmented in the legs bilaterally.  There is significant fibrosis and scarring with hyperkeratosis.  There is the shininess to the skin.  There is +2 pitting edema below the knees bilaterally.  This is still overall significantly improved.  The rubor along the right leg is essentially resolved.  All ulcerations are now healed except for the left lateral upper calf.  There are 2 ulcerations there.  The more proximal measuring 2 x 2 centimeters and the more distal measuring 1 x 1 cm.  There is some slough and fibrin on them.   There is no pain to palpation.  There is no odor.  There is a very small amount of serous drainage.  The granulation tissue has improved.   Please see the flow sheet for measures.    Xenia Ochoa MD, ABWMS, FACCWS, [facility]  Medical Director Wound Care and Lymphedema  [facility]  [phone]

## 2021-06-10 NOTE — PROGRESS NOTES
"Follow up Vascular Visit       Date of Service:4/14/2017    Date Last Seen: Visit date not found; 3/15/2017    Chief Complaint: left leg ulcer; BLE swelling; lymphedema    History:   Past Medical History:   Diagnosis Date     Cirrhosis, biliary      GERD (gastroesophageal reflux disease)      Hypothyroid      Liver disease      Lymphedema      Portal hypertension      Primary biliary cirrhosis        Pt returns to the Vascular clinic with regards to their left leg ulcer; BLE swelling; lymphedema. She arrives today with her . She is typically seen by Dr. Ochoa I am seeing her in her absence. Pt current has home care. Her current POC consists of silvercel to the ulcers; covering with gauze; roll gauze; changing 2-3 times per week. She is feeling well. She did not tolerate tubigrips; not using this. She wraps the comprilan in figure 8 pattern; does not tolerate spiral. Her breathing is better. No fevers, chills. Getting easier to walk. She is wondering if she can go back to her lymphedema pump; this was very comfortable last time she used this.     Allergies: Review of patient's allergies indicates no known allergies.    Physical Exam:    /62  Pulse 72  Temp 98.4  F (36.9  C)  Resp 16  Ht 5' 1\" (1.549 m)  Wt (!) 237 lb (107.5 kg) Comment: per pt report  BMI 44.78 kg/m2    General:  Patient presents to clinic in no apparent distress.  Head: normocephalic atraumatic  Psychiatric:  Alert and oriented x3.   Respiratory: unlabored breathing; no cough  Integumentary:  Skin is uniformly warm, dry and pink.    Wound #1 Location: left lateral leg  Size: 1.5L x 1.5W x 0depth.  No sinus tract present, Wound base: initially covered with fibrinous material this was debrided to reveal newly epithelialized skin  No undermining present. Periwound: no denudement, erythema, induration, maceration or warmth. Swelling is improved; noted that they are only wrapping her legs to mid shin; no rash; no erythema; no pain " with palpation.      Circumferential volume measures:    Vasc Edema 1/24/2017 1/26/2017 2/13/2017 3/15/2017 4/14/2017   Right just above MTP 25.0 23 23.5 23.0 21   Right Ankle 32.0 33.3 32.5 30.0 34.5   Right Widest Calf 76.0 74.8 68.9 72.0 65   Right Thigh Up 10cm - - - - -   Left - just above MTP 23.5 22 22.8 23.0 21.5   Left Ankle 34.0 33.6 32 31.0 32   Left Widest Calf 75.0 73.4 67 69.0 65   Left Thigh Up 10cm - - - - -       Ulceration(s)/Wound(s):     Wound 03/15/17 LEFT LATERAL SUPERIOR (Active)   Pre Size Length 1.5 4/14/2017  1:00 PM   Pre Size Width 1.5 4/14/2017  1:00 PM   Pre Total Sq cm 2.25 4/14/2017  1:00 PM   Post Size Length 1.1 3/15/2017  1:00 PM   Post Size Width 2.4 3/15/2017  1:00 PM   Post Size Depth 0.2 3/15/2017  1:00 PM   Post Total Sq cm 2.64 3/15/2017  1:00 PM           Lab Values    Lab Results   Component Value Date    SEDRATE 17 01/03/2017     Lab Results   Component Value Date    CREATININE 0.98 04/12/2017     No results found for: HGBA1C  Lab Results   Component Value Date    BUN 17 04/12/2017     Lab Results   Component Value Date    ALBUMIN 2.3 (L) 04/12/2017     No results found for: [id number]          Impression:  Bilateral leg swelling dependent with aching-improved  Lipedema  Venous hypertension and insufficiency and ulcerations - resolved  Lymphedema with hyperkeratosis and skin fibrosis in distal toes.-improved  Primary biliary cirrhosis  Morbid obesity        Assessment/Plan:          1. Excisional debridement of the ulcer(s) was recommended today, after consent was obtained and 2% Xylocaine was applied using a sterile curet the epidermal and dermal was sharply debrided for a total square cm of 2.25. The devitalized and necrotic tissue was removed and the wounds appeared much  afterwards. The patient tolerated this well.           2. Edema: continue short stretch bandaging; can restart her lymphedema pump           3.  Wound treatment:mepilex border to the healed  area; change every 3 days; can stop in 2 weeks; lotion daily          Patient will follow up with Dr. Ochoa in 4 weeks for reevaluation. They were instructed to call the clinic sooner with any signs or symptoms of infection or any further questions/concerns. Answered all questions.    Lety Solano DNP, RN, CNP, [facility]  [phone]

## 2021-06-10 NOTE — PROGRESS NOTES
Date of Service: 05/15/17    Date last seen:  03/15/2017    PCP: Narinder Lopez MD    Impression:  1. Bilateral leg swelling dependent with aching  2. Lipedema  3. Venous hypertension and insufficiency and ulcerations improving-left lateral calf ulcer  4. Lymphedema with hyperkeratosis and skin fibrosis in distal toes.  5. Primary biliary cirrhosis  6. Morbid obesity  7. Vitamin A deficiency    Plan:  1.  Questions were answered.  present.   2.  New compression written for.  3.  Continue exercixse, compression and  compression pump.   Bandage when needed.  4.  Recheck Vit A. Written.  She can also get this done with her gastroenterologist.  5.  Follow up in 6 months or when needed.     Time spent with patient 15 minutes with greater than 50% time in consultation, education and coordination of care.   ---------------------------------------------------------------------------------------------------------------------    Chief Complaint: Bilateral leg swelling with leg weeping.    History of Present Illness:  Maisha Syed returns to the [facility] for follow up of bilateral leg swelling with leg ulcerations.  She's been getting 2 layer compression which is been being changed at home twice a week.  She was healing.  She was recently hospitalized for her   primary biliary cirrhosis.  She had problems with ammonia built up.  She has finally been stabilized.  They continue to bandage and now is back to using the compression pump.    Her sores have all healed.  There has been no new numbness, tingling, weakness, masses, rashes, shortness of breath or chest pain.  There has been no new fevers or any increasing pain.  She is starting to exercise more.  She feels she is more independent.        Past Medical History:   Diagnosis Date     Cirrhosis, biliary      GERD (gastroesophageal reflux disease)      Hypothyroid      Liver disease      Lymphedema      Portal hypertension      Primary biliary  cirrhosis        Past Surgical History:   Procedure Laterality Date     CHOLECYSTECTOMY  2005     ESOPHAGOGASTRODUODENOSCOPY N/A 12/8/2016    Procedure: ESOPHAGOGASTRODUODENOSCOPY;  Surgeon: Gamaliel Calvert MD;  Location: [facility];  Service:          Current Outpatient Prescriptions:      bumetanide (BUMEX) 1 MG tablet, Take 1 tablet (1 mg total) by mouth 2 (two) times a day at 9am and 6pm., Disp: 60 tablet, Rfl: 0     lactulose 20 gram/30 mL Soln solution, Take 30 mL (20 g total) by mouth 2 (two) times a day., Disp: , Rfl: 0     levothyroxine (SYNTHROID, LEVOTHROID) 112 MCG tablet, Take 112 mcg by mouth Daily at 6:00 am. , Disp: , Rfl:      magnesium oxide 250 mg Tab, Take 2 tablets (500 mg total) by mouth 2 (two) times a day., Disp: , Rfl: 0     omeprazole (PRILOSEC) 40 MG capsule, Take 40 mg by mouth 2 (two) times a day., Disp: , Rfl:      rifAXIMin (XIFAXAN) 550 mg Tab tablet, Take 1 tablet (550 mg total) by mouth 2 (two) times a day., Disp: 60 tablet, Rfl: 0     spironolactone (ALDACTONE) 50 MG tablet, Take 3 tablets (150 mg total) by mouth daily., Disp: 90 tablet, Rfl: 0     ursodiol (ACTIGALL) 300 mg capsule, Take 600 mg by mouth 2 (two) times a day. , Disp: , Rfl:      vitamin A 30059 UNIT capsule, Take 10,000 Units by mouth 2 (two) times a day., Disp: , Rfl:     No Known Allergies    Social History     Social History     Marital status:      Spouse name: Benson      Number of children: 3     Years of education: N/A     Occupational History     [employer] Other     [employer] (On disability)     Social History Main Topics     Smoking status: Never Smoker     Smokeless tobacco: Never Used     Alcohol use No     Drug use: No     Sexual activity: Yes     Partners: Male     Birth control/ protection: Post-menopausal, Surgical     Other Topics Concern     Not on file     Social History Narrative       Family History   Problem Relation Age of Onset     Varicose Veins Sister       Edema Sister      Edema Mother      Heart attack Father      Heart attack Brother      Stomach cancer Sister      No Medical Problems Sister      No Medical Problems Sister        Review of Systems:  Maisha CALIXTO White no new numbess, tingling or weakness, redness or rashes, fevers, new masses, abdominal bloating or discomfort, unexplained weight loss, new ulcers, shortness of breath and chest pain  Full 12 point review of systems was completed.      Physical Exam:  Vitals:    05/15/17 1350   BP: 132/62   Pulse: 68   Resp: 20   Temp: 97.6  F (36.4  C)    There is no height or weight on file to calculate BMI.    Circumferential measures:    [facility] Edema 1/26/2017 2/13/2017 3/15/2017 4/14/2017 5/15/2017   Right just above MTP 23 23.5 23.0 21 20.1   Right Ankle 33.3 32.5 30.0 34.5 25.7   Right Widest Calf 74.8 68.9 72.0 65 55.8   Right Thigh Up 10cm - - - - 54.0   Left - just above MTP 22 22.8 23.0 21.5 20.3   Left Ankle 33.6 32 31.0 32 27.1   Left Widest Calf 73.4 67 69.0 65 51.4   Left Thigh Up 10cm - - - - 53.2     Measures much improved.    General:  66 y.o. female in no apparent distress.  Alert and oriented x 3.  Cooperative. Affect normal.     Integumentary: Skin of the legs is hyperpigmented in the legs bilaterally.  There is  fibrosis and scarring with hyperkeratosis, that has improved greatly. There is +1 pitting edema in the legs bilaterally. No open ulcerations are noted.     Xenia Ochoa MD, ABWMS, FACCWS, [facility]  Medical Director Wound Care and Lymphedema  [facility]  [phone]

## 2021-06-11 NOTE — PROGRESS NOTES
Follow up Vascular Visit       Date of Service:7/18/2017    Date Last Seen: Visit date not found; 3/15/2017    Chief Complaint:  BLE swelling; lymphedema    History:   Past Medical History:   Diagnosis Date     Cirrhosis, biliary      Disease of thyroid gland     family hx. on meds     GERD (gastroesophageal reflux disease)      History of transfusion 02/2017     Liver disease      Lymphedema      Portal hypertension      Primary biliary cirrhosis        Pt returns to the Vascular clinic with regards to their BLE swelling; lymphedema. She arrives today with her . She is typically seen by Dr. Ochoa I am seeing her in her absence. Pt no longer has home care. Her wounds have all healed. She was last seen by Dr. Ochoa; a vitamin a level was drawn this was still mildly low; she continues on her supplement. She obtained new compression stockings; she does not like these; too binding, they went back to old velcro wraps; these are fitting the greatest she is wondering if she can get resized for these with Imani at [facility]. She has recently gained 20-30 pounds of fluid, has primary biliary cirrhosis; is being followed by GI for this; she also will be seeing a nephrologist in 2 weeks to discuss diuretic options; she currently is on bumex and spirolactone; no dose adjustments recently. She is feeling well. Her breathing is stable; no cp; no sob; no cough; no new n/t in the legs this is stable. No fevers, chills. Getting easier to walk. She is her lymphedema pump daily for 60 minutes; this is comfortable. She is elevating her legs throughout the day. Her  assists her throughout the day. They are wondering what to do about the ill fitting garments and the worsening thigh swelling.    Allergies: Review of patient's allergies indicates no known allergies.    Past Medical History:   Diagnosis Date     Cirrhosis, biliary      Disease of thyroid gland     family hx. on meds     GERD (gastroesophageal reflux  disease)      History of transfusion 02/2017     Liver disease      Lymphedema      Portal hypertension      Primary biliary cirrhosis          Physical Exam:    Pulse 68  Temp 98.9  F (37.2  C) (Temporal)   Resp 20    General:  Patient presents to clinic in no apparent distress.  Head: normocephalic atraumatic  Psychiatric:  Alert and oriented x3.   Respiratory: unlabored breathing; no cough  Integumentary:  Skin is uniformly warm, dry and pink.    Extremities: Skin intact; there is an approx 4x20cm area of ecchymoses on the left lateral thigh; skin intact; no induration; appears to be fading. There is no rash; no erythema; skin is well moisturized; no callous; webbing clear; nails well trained; swelling measures are improved in the foot, ankle, and calf; but worse in the thighs; see measures below.       Circumferential volume measures:    Vasc Edema 2/13/2017 3/15/2017 4/14/2017 5/15/2017 7/18/2017   Right just above MTP 23.5 23.0 21 20.1 20.2   Right Ankle 32.5 30.0 34.5 25.7 26.5   Right Widest Calf 68.9 72.0 65 55.8 54   Right Thigh Up 10cm - - - 54.0 61.5   Left - just above MTP 22.8 23.0 21.5 20.3 21.5   Left Ankle 32 31.0 32 27.1 27.2   Left Widest Calf 67 69.0 65 51.4 47   Left Thigh Up 10cm - - - 53.2 63.3       Ulceration(s)/Wound(s):     none    Lab Values    Lab Results   Component Value Date    SEDRATE 17 01/03/2017     Lab Results   Component Value Date    CREATININE 1.10 06/09/2017     No results found for: HGBA1C  Lab Results   Component Value Date    BUN 21 06/09/2017     Lab Results   Component Value Date    ALBUMIN 2.3 (L) 06/09/2017     No results found for: [id number]          Impression:  Bilateral leg swelling dependent with aching-improved  Lipedema  Venous hypertension and insufficiency and ulcerations - resolved  Lymphedema with hyperkeratosis and skin fibrosis in distal toes.-improved  Primary biliary cirrhosis  Morbid obesity  Vitamin A deficiency        Assessment/Plan:          1.  Excisional debridement of the ulcer(s) was not recommended today as the skin is intact          2. Edema: continue short stretch bandaging as needed; otherwise continue with the velcro wraps; will get scheduled with Imani ng [facility] and see if she needs new garments vs alteration of current compression garments; can continue her lymphedema pump. I encouraged the patient that this is her maintenance program and she is doing very well; this is very time consuming but she is doing all the things she needs to do. They were inquiring about home care; explained that they would not qualify for coverage of this; donning and doffing garments is not a skilled nursing need; would have to pay OOP for these services; they will consider this. Follow up with GI as scheduled. Follow up with Dr. Ochoa as scheduled. I do not feel she needs lymphedema therapy at this time. Will see nephrology in 2 weeks to look at her labs and see if any diuretic dosing can be adjusted.           3.  Wound treatment: lotion daily           4. Vitamin A deficiency: on supplements; monitored by GI          Patient will follow up with me in 4 weeks for reevaluation and  Dr. Ochoa in 3 months as scheduled. They were instructed to call the clinic sooner with any signs or symptoms of infection or any further questions/concerns. Answered all questions.    Lety Solano DNP, RN, CNP, [facility]  [phone]

## 2021-06-12 NOTE — PROGRESS NOTES
Follow up Vascular Visit       Date of Service:9/12/2017    Date Last Seen: Visit date not found; 3/15/2017    Chief Complaint:  BLE swelling; lymphedema    History:   Past Medical History:   Diagnosis Date     Cirrhosis, biliary      Disease of thyroid gland     family hx. on meds     GERD (gastroesophageal reflux disease)      History of transfusion 02/2017     Liver disease      Lymphedema      Portal hypertension      Primary biliary cirrhosis        Pt returns to the Vascular clinic with regards to their BLE swelling; lymphedema. She arrives today alone. She is typically seen by Dr. Ochoa I am seeing her in her absence. Pt currently has home care. Her wounds have all healed. She was last seen by Dr. Ochoa; a vitamin a level was drawn this was still mildly low; she continues on her supplement. She obtained new compression stockings; she does not like these; too binding, they went back to old velcro wraps; these were not fitting well; so we sent her back to Imani at [facility] and she was resized for a new pair; she likes these much more. Her swelling got much worse; we has to go back to short stretch bandaging; she arrives today with these in place but they are applied incorrectly. She has recently gained 20-30 pounds of fluid, has primary biliary cirrhosis; is being followed by GI for this; she is scheduled to get a second opinion at the [facility] for liver transplant, she also following with a nephrologist  to discuss diuretic options; she currently is on bumex and spirolactone; no dose adjustments recently. She is feeling well. Her breathing is stable; no cp; no sob; no cough; no new n/t in the legs this is stable. No fevers, chills. Getting easier to walk. She is using her lymphedema pump daily for 60 minutes; this is comfortable. She is elevating her legs throughout the day. Her  assists her throughout the day.     Allergies: Review of patient's allergies indicates no known allergies.    Past  Medical History:   Diagnosis Date     Cirrhosis, biliary      Disease of thyroid gland     family hx. on meds     GERD (gastroesophageal reflux disease)      History of transfusion 02/2017     Liver disease      Lymphedema      Portal hypertension      Primary biliary cirrhosis          Physical Exam:    /62  Pulse 72  Temp 97.7  F (36.5  C) (Temporal)   Resp 20    General:  Patient presents to clinic in no apparent distress.  Head: normocephalic atraumatic; non-icteric sclera  Psychiatric:  Alert and oriented x3.   Respiratory: unlabored breathing; no cough  Integumentary:  Skin is uniformly warm, dry; +jaundice  Extremities:  skin intact; no induration; There is no rash; no erythema; skin is well moisturized; no callous; webbing clear; nails well trained; swelling measures are much improved today; see measures below    Circumferential volume measures:    Vasc Edema 4/14/2017 5/15/2017 7/18/2017 8/8/2017 9/12/2017   Right just above MTP 21 20.1 20.2 21 19.2   Right Ankle 34.5 25.7 26.5 30.6 30.2   Right Widest Calf 65 55.8 54 57.5 49.6   Right Thigh Up 10cm - 54.0 61.5 71.2 54.8   Left - just above MTP 21.5 20.3 21.5 22.2 22.6   Left Ankle 32 27.1 27.2 29.5 24.5   Left Widest Calf 65 51.4 47 56.6 56   Left Thigh Up 10cm - 53.2 63.3 64.4 59.5       Ulceration(s)/Wound(s):     none    Lab Values    Lab Results   Component Value Date    SEDRATE 17 01/03/2017     Lab Results   Component Value Date    CREATININE 1.10 06/09/2017     No results found for: HGBA1C  Lab Results   Component Value Date    BUN 21 06/09/2017     Lab Results   Component Value Date    ALBUMIN 2.3 (L) 06/09/2017     No results found for: [id number]          Impression:  Bilateral leg swelling dependent with aching-improved  Lipedema  Venous hypertension and insufficiency and ulcerations - resolved  Lymphedema with hyperkeratosis and skin fibrosis in distal toes.-improved  Primary biliary cirrhosis  Morbid obesity  Vitamin A  deficiency        Assessment/Plan:          1. Excisional debridement of the ulcer(s) was not recommended today as the skin is intact          2. Edema: go back into her new velcro wraps; she wants another prescription for velcro wraps; in case her current ones are not fitting; she did not bring these with her so we cannot evaluate these; can continue her lymphedema pump. Follow up with GI as scheduled; getting second opinion at the [facility]; they are wanting to know about transplant. Follow up with Dr. Ochoa as scheduled. I do not feel she needs lymphedema therapy at this time. Follow up with nephrology as scheduled.           3.  Wound treatment: lotion daily; can get some Sarna for the itching on her arms and back           4. Vitamin A deficiency: on supplements; monitored by GI          Patient will follow up with Dr. Ochoa in 2 months as scheduled. They were instructed to call the clinic sooner with any signs or symptoms of infection or any further questions/concerns. Answered all questions.    Lety Solano DNP, RN, CNP, CWOCN  [facility]  [phone]

## 2021-06-12 NOTE — PROGRESS NOTES
Follow up Vascular Visit       Date of Service:8/8/2017    Date Last Seen: Visit date not found; 3/15/2017    Chief Complaint:  BLE swelling; lymphedema    History:   Past Medical History:   Diagnosis Date     Cirrhosis, biliary      Disease of thyroid gland     family hx. on meds     GERD (gastroesophageal reflux disease)      History of transfusion 02/2017     Liver disease      Lymphedema      Portal hypertension      Primary biliary cirrhosis        Pt returns to the Vascular clinic with regards to their BLE swelling; lymphedema. She arrives today with her . She is typically seen by Dr. Ochoa I am seeing her in her absence. Pt no longer has home care. Her wounds have all healed. She was last seen by Dr. Ochoa; a vitamin a level was drawn this was still mildly low; she continues on her supplement. She obtained new compression stockings; she does not like these; too binding, they went back to old velcro wraps; these were not fitting well; so we sent her back to Imani at [facility] and she was resized for a new pair; she likes these much more. She has recently gained 20-30 pounds of fluid, has primary biliary cirrhosis; is being followed by GI for this; she is scheduled to get a second opinion at the [facility] for liver transplant, she also will be seeing a nephrologist in 1 week to discuss diuretic options; she currently is on bumex and spirolactone; no dose adjustments recently. She is feeling well. Her breathing is stable; no cp; no sob; no cough; no new n/t in the legs this is stable. No fevers, chills. Getting easier to walk. She is her lymphedema pump daily for 60 minutes; this is comfortable. She is elevating her legs throughout the day. Her  assists her throughout the day. Wondering what to do about the worsening swelling in her legs.    Allergies: Review of patient's allergies indicates no known allergies.    Past Medical History:   Diagnosis Date     Cirrhosis, biliary      Disease of  thyroid gland     family hx. on meds     GERD (gastroesophageal reflux disease)      History of transfusion 02/2017     Liver disease      Lymphedema      Portal hypertension      Primary biliary cirrhosis          Physical Exam:    BP 90/58  Pulse 76  Temp 98.4  F (36.9  C) (Temporal)   Resp 20    General:  Patient presents to clinic in no apparent distress.  Head: normocephalic atraumatic; non-icteric sclera  Psychiatric:  Alert and oriented x3.   Respiratory: unlabored breathing; no cough  Integumentary:  Skin is uniformly warm, dry; +jaundice  Extremities:  skin intact; no induration; There is no rash; no erythema; skin is well moisturized; no callous; webbing clear; nails well trained; swelling measures are much worse today; see measures below    Circumferential volume measures:    Vasc Edema 3/15/2017 4/14/2017 5/15/2017 7/18/2017 8/8/2017   Right just above MTP 23.0 21 20.1 20.2 21   Right Ankle 30.0 34.5 25.7 26.5 30.6   Right Widest Calf 72.0 65 55.8 54 57.5   Right Thigh Up 10cm - - 54.0 61.5 71.2   Left - just above MTP 23.0 21.5 20.3 21.5 22.2   Left Ankle 31.0 32 27.1 27.2 29.5   Left Widest Calf 69.0 65 51.4 47 56.6   Left Thigh Up 10cm - - 53.2 63.3 64.4       Ulceration(s)/Wound(s):     none    Lab Values    Lab Results   Component Value Date    SEDRATE 17 01/03/2017     Lab Results   Component Value Date    CREATININE 1.10 06/09/2017     No results found for: HGBA1C  Lab Results   Component Value Date    BUN 21 06/09/2017     Lab Results   Component Value Date    ALBUMIN 2.3 (L) 06/09/2017     No results found for: [id number]          Impression:  Bilateral leg swelling dependent with aching-worse  Lipedema  Venous hypertension and insufficiency and ulcerations - resolved  Lymphedema with hyperkeratosis and skin fibrosis in distal toes.-improved  Primary biliary cirrhosis  Morbid obesity  Vitamin A deficiency        Assessment/Plan:          1. Excisional debridement of the ulcer(s) was not  recommended today as the skin is intact          2. Edema: will go back to the  short stretch bandaging; changing every other day;  cannot do; will restart home care for this; then will hopefully get her back into her new velcro wraps;  can continue her lymphedema pump. Follow up with GI as scheduled; getting second opinion at the [facility]; they are wanting to know about transplant. Follow up with Dr. Ochoa as scheduled. I do not feel she needs lymphedema therapy at this time. Will see nephrology in 1 weeks to look at her labs and see if any diuretic dosing can be adjusted.           3.  Wound treatment: lotion daily           4. Vitamin A deficiency: on supplements; monitored by GI          Patient will follow up with me in 4 weeks for reevaluation and  Dr. Ochoa in 2 months as scheduled. They were instructed to call the clinic sooner with any signs or symptoms of infection or any further questions/concerns. Answered all questions.    Lety Solano DNP, RN, CNP, [facility]  [phone]

## 2021-06-13 NOTE — PROGRESS NOTES
Patient did walk in again to clinic and is requesting home care for wound care.   She has had [facility] in the past.   It is a hardship for her  to do patients cares and I have bumped her appointment up to next Wednesday with Lety Solano CNP.  Patient is refusing the hospital and reports she does not believe she has cellulitis at this time.  I did let her know if her symptoms worsen she will need to seek the ER or her PMD.  Patient is understanding of the plan. Messages forwarded to Lety Solano CNP for further advise.

## 2021-06-13 NOTE — PROGRESS NOTES
Follow up Vascular Visit       Date of Service:11/1/2017    Date Last Seen: Visit date not found; 3/15/2017    Chief Complaint:  BLE swelling; lymphedema    History:   Past Medical History:   Diagnosis Date     Cirrhosis, biliary      Disease of thyroid gland     family hx. on meds     GERD (gastroesophageal reflux disease)      History of transfusion 02/2017     Liver disease      Lymphedema      Portal hypertension      Primary biliary cirrhosis        Pt returns to the Vascular clinic with regards to their BLE swelling; lymphedema. She arrives today with her . She is typically seen by Dr. Ochoa I am seeing her in her absence. Pt currently does not have home care. Her wounds have all healed and remained healed. She was last seen by Dr. Ochoa; a vitamin a level was drawn this was still mildly low; she continues on her supplement. She obtained new compression stockings; she does not like these; too binding, they went back to old velcro wraps; these were not fitting well; so we sent her back to [facility] and she was resized for a new pair; she likes these much more. Her swelling got much worse; we had to go back to short stretch bandaging; she arrives today with these in place but they are applied incorrectly. She has recently gained 20-30 pounds of fluid, has now lost this again; has primary biliary cirrhosis; is being followed by GI for this; obtained second opinion at the [facility] for liver transplant they are not going to move forward with this, she also following with a nephrologist  to discuss diuretic options; she currently is on bumex and spirolactone; no dose adjustments recently. She is feeling well. Her breathing is stable; no cp; no sob; no cough; no new n/t in the legs this is stable. No fevers, chills. Getting easier to walk. She is using her lymphedema pump daily for 60 minutes; this is comfortable. She is elevating her legs throughout the day. Her  assists her throughout the  "day. Her  is feeling overwhelmed with providing cares; they are asking for assistance from home care.    Allergies: Review of patient's allergies indicates no known allergies.    Past Medical History:   Diagnosis Date     Cirrhosis, biliary      Disease of thyroid gland     family hx. on meds     GERD (gastroesophageal reflux disease)      History of transfusion 02/2017     Liver disease      Lymphedema      Portal hypertension      Primary biliary cirrhosis          Physical Exam:    BP (!) 84/48  Pulse 80  Temp 97.9  F (36.6  C) (Temporal)   Resp 20  Ht 5' 1\" (1.549 m)  Wt 217 lb (98.4 kg)  BMI 41 kg/m2    General:  Patient presents to clinic in no apparent distress.  Head: normocephalic atraumatic; non-icteric sclera  Psychiatric:  Alert and oriented x3.   Respiratory: unlabored breathing; no cough  Integumentary:  Skin is uniformly warm, dry; +jaundice  Extremities:  skin intact; no induration; There is no rash; no erythema; skin is well moisturized; no callous; webbing clear; nails well trained; swelling measures are worsened today in the thighs, see measures below    Circumferential volume measures:    Vasc Edema 5/15/2017 7/18/2017 8/8/2017 9/12/2017 11/1/2017   Right just above MTP 20.1 20.2 21 19.2 20.4   Right Ankle 25.7 26.5 30.6 30.2 26.6   Right Widest Calf 55.8 54 57.5 49.6 60.1   Right Thigh Up 10cm 54.0 61.5 71.2 54.8 78.9   Left - just above MTP 20.3 21.5 22.2 22.6 19.5   Left Ankle 27.1 27.2 29.5 24.5 26.2   Left Widest Calf 51.4 47 56.6 56 59.4   Left Thigh Up 10cm 53.2 63.3 64.4 59.5 69.8       Ulceration(s)/Wound(s):     none    Lab Values    Lab Results   Component Value Date    SEDRATE 17 01/03/2017     Lab Results   Component Value Date    CREATININE 1.10 06/09/2017     No results found for: HGBA1C  Lab Results   Component Value Date    BUN 21 06/09/2017     Lab Results   Component Value Date    ALBUMIN 2.3 (L) 06/09/2017     No results found for: " [id number]          Impression:  Bilateral leg swelling dependent with aching-improved  Lipedema  Venous hypertension and insufficiency and ulcerations - resolved  Lymphedema with hyperkeratosis and skin fibrosis in distal toes.-improved  Primary biliary cirrhosis  Morbid obesity  Vitamin A deficiency        Assessment/Plan:          1. Excisional debridement of the ulcer(s) was not recommended today as the skin is intact          2. Edema: Will order home care again to assist with short stretch compression bandaging; may need to get palliative care now that she is not moving forward with transplant;  Follow up with Dr. Ochoa as scheduled. I do not feel she needs lymphedema therapy at this time. Follow up with nephrology as scheduled.           3.  Wound treatment: lotion daily; can get some Sarna for the itching on her arms and back           4. Vitamin A deficiency: on supplements; monitored by GI          Patient will follow up with Dr. Ochoa in 2 weeks as scheduled. They were instructed to call the clinic sooner with any signs or symptoms of infection or any further questions/concerns. Answered all questions.    Lety Solano DNP, RN, CNP, CWN  [facility]  [phone]

## 2021-06-14 NOTE — PROGRESS NOTES
Follow up Vascular Visit       Date of Service:11/17/2017    Date Last Seen: Visit date not found; 3/15/2017    Chief Complaint:  BLE swelling; lymphedema    History:   Past Medical History:   Diagnosis Date     Cirrhosis, biliary      Disease of thyroid gland     family hx. on meds     GERD (gastroesophageal reflux disease)      History of transfusion 02/2017     Liver disease      Lymphedema      Portal hypertension      Primary biliary cirrhosis        Pt returns to the Vascular clinic with regards to their BLE swelling; lymphedema. She arrives today with her . She is typically seen by Dr. Ochoa I am seeing her in her absence. Pt currently has home care now coming every other day. Her wounds have all healed and remained healed. She was last seen by Dr. Ochoa; a vitamin a level was drawn this was still mildly low; she continues on her supplement. She obtained new compression stockings; she does not like these; too binding, they went back to old velcro wraps; these were not fitting well; so we sent her back to [facility] and she was resized for a new pair; she likes these much more. Her swelling got much worse; we had to go back to short stretch bandaging; she arrives today with these in place. She has recently gained 20-30 pounds of fluid, has now lost this again; has primary biliary cirrhosis; is being followed by GI for this; obtained second opinion at the [facility] for liver transplant they are not going to move forward with this, she also following with a nephrologist  to discuss diuretic options; she currently is on bumex and spirolactone; no dose adjustments recently. She is feeling well. Her breathing is stable; no cp; no sob; no cough; no new n/t in the legs this is stable. No fevers, chills. Getting easier to walk. She is using her lymphedema pump daily for 60 minutes; this is comfortable. She is elevating her legs throughout the day. Her  assists her throughout the day. Her   is feeling overwhelmed with providing cares. He has to frequently re-wrap her legs when they shift down.    Allergies: Review of patient's allergies indicates no known allergies.    Past Medical History:   Diagnosis Date     Cirrhosis, biliary      Disease of thyroid gland     family hx. on meds     GERD (gastroesophageal reflux disease)      History of transfusion 02/2017     Liver disease      Lymphedema      Portal hypertension      Primary biliary cirrhosis          Physical Exam:    There were no vitals taken for this visit.    General:  Patient presents to clinic in no apparent distress.  Head: normocephalic atraumatic; non-icteric sclera  Psychiatric:  Alert and oriented x3.   Respiratory: unlabored breathing; no cough  Integumentary:  Skin is uniformly warm, dry; +jaundice  Extremities:  skin intact; no induration; There is no rash; no erythema; skin is well moisturized; no callous; webbing clear; nails well trained; swelling measures are worsened today in the calves, see measures below    Circumferential volume measures:    Vasc Edema 7/18/2017 8/8/2017 9/12/2017 11/1/2017 11/17/2017   Right just above MTP 20.2 21 19.2 20.4 20   Right Ankle 26.5 30.6 30.2 26.6 28   Right Widest Calf 54 57.5 49.6 60.1 65   Right Thigh Up 10cm 61.5 71.2 54.8 78.9 -   Left - just above MTP 21.5 22.2 22.6 19.5 20.5   Left Ankle 27.2 29.5 24.5 26.2 29   Left Widest Calf 47 56.6 56 59.4 -   Left Thigh Up 10cm 63.3 64.4 59.5 69.8 -       Ulceration(s)/Wound(s):     none    Lab Values    Lab Results   Component Value Date    SEDRATE 17 01/03/2017     Lab Results   Component Value Date    CREATININE 1.10 06/09/2017     No results found for: HGBA1C  Lab Results   Component Value Date    BUN 21 06/09/2017     Lab Results   Component Value Date    ALBUMIN 2.3 (L) 06/09/2017     No results found for: [id number]          Impression:  Bilateral leg swelling dependent with aching  Lipedema  Venous hypertension and insufficiency  and ulcerations - resolved  Lymphedema with hyperkeratosis and skin fibrosis in distal toes.-improved  Primary biliary cirrhosis  Morbid obesity  Vitamin A deficiency        Assessment/Plan:          1. Excisional debridement of the ulcer(s) was not recommended today as the skin is intact          2. Edema: Continue home care again to assist with short stretch compression bandaging; may need to get palliative care now that she is not moving forward with transplant;  Follow up with Dr. Ochoa as scheduled. I do not feel she needs lymphedema therapy at this time. Follow up with nephrology as scheduled.           3.  Wound treatment: lotion daily; can get some Sarna for the itching on her arms and back           4. Nutrition: Vitamin A deficiency on supplements; monitored by GI; low albumin should be managed by nephrology          Patient will follow up with Dr. Ochoa in 4 weeks. They were instructed to call the clinic sooner with any signs or symptoms of infection or any further questions/concerns. Answered all questions.    Lety Solano DNP, RN, CNP, [facility]  [phone]

## 2021-06-25 NOTE — PROGRESS NOTES
Progress Notes by Adalberto Porter RN at 1/17/2017  2:00 PM     Author: Adalberto Porter RN Service: -- Author Type: Registered Nurse    Filed: 1/17/2017  3:00 PM Encounter Date: 1/17/2017 Status: Addendum    : Adalberto Porter RN (Registered Nurse)    Related Notes: Original Note by Adalberto Porter RN (Registered Nurse) filed at 1/17/2017  2:59 PM                   1/17/17 2 layer lite change. Arrives without compression on. Wounds are covered with telfa/abd.  said leaked through 2 layers over the weekend. Reapplied endoform/adaptic/abd bilateral 2 layer lite.  Nurse Visit      Date of Service:1/17/2017    Chief Complaint: Patient presents to clinic for evaluation of their ulcer and and swelling    Dressing on Arrival telfa/abd not compression little change in swelling. Pressure shelf noted on posterior left calf. Advised to try to keep pressure off. No redness noted      Allergies: Review of patient's allergies indicates no known allergies.    Assessment:    Visit Vitals   ? /66   ? Pulse 84   ? Temp 98.7  F (37.1  C) (Oral)   ? Resp 14       General:  Patient presents to clinic in no apparent distress.  Psychiatric:  Alert and oriented x3.   Lower extremity:  edema is and is not present.    Integumentary:  Skin is uniformly warm, dry and pink.    Wound size:   Wound 01/10/17 Left medial shin (Active)   Pre Size Length 1.5 1/12/2017  3:00 PM   Pre Size Width 2 1/12/2017  3:00 PM   Pre Size Depth 0.2 1/12/2017  3:00 PM   Pre Total Sq cm 3 1/12/2017  3:00 PM       Wound 01/10/17 Right medial (Active)   Pre Size Length 1 1/12/2017  3:00 PM   Pre Size Width 4 1/12/2017  3:00 PM   Pre Total Sq cm 4 1/12/2017  3:00 PM       Wound 01/10/17 Right shin (Active)   Pre Size Length 1 1/12/2017  3:00 PM   Pre Size Width 1 1/12/2017  3:00 PM   Pre Total Sq cm 1 1/12/2017  3:00 PM       Wound Right posterior calf (Active)   Pre Size Length 2 1/12/2017  3:00 PM   Pre Size Width 2 1/12/2017  3:00 PM   Pre Total Sq  cm 4 1/12/2017  3:00 PM      Undermining none    The periwoundskin is WNL, denudement, erythema, induration, maceration and or warmth none noted. Legs washed and lotion applied      Plan:         1. Reapply dressings per Dr Ochoa. Patient tolerating 2 laer lite well bilaterally         2.  Wound treatment:endoform/adaptic/abd/2 layer lite wounds cleaned with microcyn         3.  Patient will follow up in thursday   for reevaluation with nurse.

## 2021-06-25 NOTE — PROGRESS NOTES
Progress Notes by Adalberto Porter RN at 1/19/2017  2:30 PM     Author: Adalberto Porter RN Service: -- Author Type: Registered Nurse    Filed: 1/19/2017  4:02 PM Encounter Date: 1/19/2017 Status: Signed    : Adalberto Porter RN (Registered Nurse)               1/19/17 Patient arrives with bilateral 2 layer lite intact both have fallen down around ankle.Left leg has heavy drainage. I talked with Kamille Nika MARTINEZ. Ok to use sorbion. I ordered dressings from [place].Patient denies pain. Little change in swelling.  Nurse Visit      Date of Service:1/19/2017    Chief Complaint: Patient presents to clinic for evaluation of their swelling and ulcers    Dressing on Arrival 2 layer lite/adaptic/collegen,abd.      Allergies: Review of patient's allergies indicates no known allergies.    Assessment:    Visit Vitals   ? /60   ? Pulse 84   ? Temp 98.5  F (36.9  C) (Oral)   ? Resp 14       General:  Patient presents to clinic in no apparent distress.  Psychiatric:  Alert and oriented x3.   Lower extremity:  edema is and is not present.    Integumentary:  Skin is uniformly warm, dry and pink.    Wound size:   Wound 01/10/17 Left medial shin (Active)   Pre Size Length 1.5 1/17/2017  2:00 PM   Pre Size Width 2 1/17/2017  2:00 PM   Pre Size Depth 0.2 1/17/2017  2:00 PM   Pre Total Sq cm 3 1/17/2017  2:00 PM   Prodcut Used Collagen;ABD Pad 1/17/2017  2:00 PM       Wound 01/10/17 Right medial (Active)   Pre Size Length 1 1/17/2017  2:00 PM   Pre Size Width 4 1/17/2017  2:00 PM   Pre Total Sq cm 4 1/17/2017  2:00 PM   Prodcut Used Collagen;ABD Pad 1/17/2017  2:00 PM       Wound 01/10/17 Right shin (Active)   Pre Size Length 1 1/17/2017  2:00 PM   Pre Size Width 1.2 1/17/2017  2:00 PM   Pre Total Sq cm 1.2 1/17/2017  2:00 PM   Prodcut Used ABD Pad;Collagen 1/17/2017  2:00 PM       Wound Right posterior calf (Active)   Pre Size Length 2 1/17/2017  2:00 PM   Pre Size Width 2 1/17/2017  2:00 PM   Pre Total Sq cm 4 1/17/2017  2:00 PM    Prodcut Used ABD Pad;Collagen 1/17/2017  2:00 PM      Undermining none.    The periwoundskin is WNL. Wounds washed with mycrocyn/legs washed and lotion applied      Plan:         1. Patient to bring in new dressings ordered from [facility]. Call place to see if patient would like to order homecare.         2.  Wound treatment:Sorbion,adaptic,collegen,abd,2 layer lite. Tolerated well.         3.  Patient will follow up in Tuesday for reevaluation with **nurse*.

## 2021-07-03 NOTE — ADDENDUM NOTE
Addendum Note by Patsy Burgos RN at 1/11/2017 10:35 AM     Author: Patsy Burgos RN Service: -- Author Type: --    Filed: 1/11/2017 10:35 AM Encounter Date: 1/10/2017 Status: Signed    : Patsy Burgos RN (Registered Nurse)    Addended by: PATSY BURGOS on: 1/11/2017 10:35 AM        Modules accepted: Orders

## 2021-07-03 NOTE — ADDENDUM NOTE
Addendum Note by Patsy Burgos RN at 1/25/2017  8:44 AM     Author: Patsy Burgos RN Service: -- Author Type: --    Filed: 1/25/2017  8:44 AM Encounter Date: 1/24/2017 Status: Signed    : Patsy Burgos RN (Registered Nurse)    Addended by: PATSY BURGOS on: 1/25/2017 08:44 AM        Modules accepted: Orders

## 2021-07-21 ENCOUNTER — RECORDS - HEALTHEAST (OUTPATIENT)
Dept: ADMINISTRATIVE | Facility: CLINIC | Age: 71
End: 2021-07-21